# Patient Record
Sex: MALE | Race: BLACK OR AFRICAN AMERICAN | NOT HISPANIC OR LATINO | Employment: OTHER | URBAN - METROPOLITAN AREA
[De-identification: names, ages, dates, MRNs, and addresses within clinical notes are randomized per-mention and may not be internally consistent; named-entity substitution may affect disease eponyms.]

---

## 2017-01-11 ENCOUNTER — LAB VISIT (OUTPATIENT)
Dept: LAB | Facility: HOSPITAL | Age: 67
End: 2017-01-11
Attending: INTERNAL MEDICINE
Payer: MEDICARE

## 2017-01-11 DIAGNOSIS — N18.30 CHRONIC KIDNEY DISEASE, STAGE III (MODERATE): ICD-10-CM

## 2017-01-11 LAB
ALBUMIN SERPL BCP-MCNC: 3.6 G/DL
ANION GAP SERPL CALC-SCNC: 6 MMOL/L
BUN SERPL-MCNC: 16 MG/DL
CALCIUM SERPL-MCNC: 9.7 MG/DL
CHLORIDE SERPL-SCNC: 99 MMOL/L
CO2 SERPL-SCNC: 33 MMOL/L
CREAT SERPL-MCNC: 1.4 MG/DL
EST. GFR  (AFRICAN AMERICAN): 59.3 ML/MIN/1.73 M^2
EST. GFR  (NON AFRICAN AMERICAN): 51.3 ML/MIN/1.73 M^2
GLUCOSE SERPL-MCNC: 115 MG/DL
PHOSPHATE SERPL-MCNC: 2.2 MG/DL
POTASSIUM SERPL-SCNC: 4.6 MMOL/L
PTH-INTACT SERPL-MCNC: 69 PG/ML
SODIUM SERPL-SCNC: 138 MMOL/L

## 2017-01-11 PROCEDURE — 36415 COLL VENOUS BLD VENIPUNCTURE: CPT

## 2017-01-11 PROCEDURE — 83970 ASSAY OF PARATHORMONE: CPT

## 2017-01-11 PROCEDURE — 80069 RENAL FUNCTION PANEL: CPT

## 2017-01-31 ENCOUNTER — OFFICE VISIT (OUTPATIENT)
Dept: INTERNAL MEDICINE | Facility: CLINIC | Age: 67
End: 2017-01-31
Payer: MEDICARE

## 2017-01-31 VITALS
SYSTOLIC BLOOD PRESSURE: 138 MMHG | DIASTOLIC BLOOD PRESSURE: 64 MMHG | OXYGEN SATURATION: 94 % | BODY MASS INDEX: 46.65 KG/M2 | TEMPERATURE: 98 F | HEIGHT: 69 IN | HEART RATE: 75 BPM | WEIGHT: 315 LBS

## 2017-01-31 DIAGNOSIS — R73.03 PRE-DIABETES: ICD-10-CM

## 2017-01-31 DIAGNOSIS — N52.9 ERECTILE DYSFUNCTION, UNSPECIFIED ERECTILE DYSFUNCTION TYPE: ICD-10-CM

## 2017-01-31 DIAGNOSIS — E78.5 HYPERLIPIDEMIA, UNSPECIFIED HYPERLIPIDEMIA TYPE: ICD-10-CM

## 2017-01-31 DIAGNOSIS — N18.3 CHRONIC KIDNEY DISEASE (CKD), STAGE 3 (MODERATE): ICD-10-CM

## 2017-01-31 DIAGNOSIS — Z23 INFLUENZA VACCINE NEEDED: ICD-10-CM

## 2017-01-31 DIAGNOSIS — E66.01 MORBID OBESITY WITH BODY MASS INDEX (BMI) OF 40.0 TO 49.9: ICD-10-CM

## 2017-01-31 DIAGNOSIS — C61 CANCER OF PROSTATE WITH HIGH RECURRENCE RISK (STAGE T3A OR GLEASON 8-10 OR PSA > 20): ICD-10-CM

## 2017-01-31 DIAGNOSIS — I11.9 HYPERTENSIVE HEART DISEASE WITHOUT HEART FAILURE: Primary | ICD-10-CM

## 2017-01-31 PROCEDURE — 3075F SYST BP GE 130 - 139MM HG: CPT | Mod: S$GLB,,, | Performed by: INTERNAL MEDICINE

## 2017-01-31 PROCEDURE — 1157F ADVNC CARE PLAN IN RCRD: CPT | Mod: S$GLB,,, | Performed by: INTERNAL MEDICINE

## 2017-01-31 PROCEDURE — 1126F AMNT PAIN NOTED NONE PRSNT: CPT | Mod: S$GLB,,, | Performed by: INTERNAL MEDICINE

## 2017-01-31 PROCEDURE — 1160F RVW MEDS BY RX/DR IN RCRD: CPT | Mod: S$GLB,,, | Performed by: INTERNAL MEDICINE

## 2017-01-31 PROCEDURE — 90662 IIV NO PRSV INCREASED AG IM: CPT | Mod: S$GLB,,, | Performed by: INTERNAL MEDICINE

## 2017-01-31 PROCEDURE — 99999 PR PBB SHADOW E&M-EST. PATIENT-LVL III: CPT | Mod: PBBFAC,,, | Performed by: INTERNAL MEDICINE

## 2017-01-31 PROCEDURE — 99499 UNLISTED E&M SERVICE: CPT | Mod: S$GLB,,, | Performed by: INTERNAL MEDICINE

## 2017-01-31 PROCEDURE — 3078F DIAST BP <80 MM HG: CPT | Mod: S$GLB,,, | Performed by: INTERNAL MEDICINE

## 2017-01-31 PROCEDURE — 99214 OFFICE O/P EST MOD 30 MIN: CPT | Mod: 25,S$GLB,, | Performed by: INTERNAL MEDICINE

## 2017-01-31 PROCEDURE — G0008 ADMIN INFLUENZA VIRUS VAC: HCPCS | Mod: S$GLB,,, | Performed by: INTERNAL MEDICINE

## 2017-01-31 PROCEDURE — 1159F MED LIST DOCD IN RCRD: CPT | Mod: S$GLB,,, | Performed by: INTERNAL MEDICINE

## 2017-01-31 RX ORDER — CARVEDILOL 12.5 MG/1
12.5 TABLET ORAL 2 TIMES DAILY
Qty: 180 TABLET | Refills: 1 | Status: SHIPPED | OUTPATIENT
Start: 2017-01-31 | End: 2018-01-08 | Stop reason: SDUPTHER

## 2017-01-31 RX ORDER — TADALAFIL 20 MG/1
20 TABLET ORAL DAILY PRN
Qty: 3 TABLET | Refills: 5 | Status: SHIPPED | OUTPATIENT
Start: 2017-01-31 | End: 2018-10-26

## 2017-01-31 RX ORDER — HYDROCHLOROTHIAZIDE 25 MG/1
25 TABLET ORAL DAILY
Qty: 90 TABLET | Refills: 1 | Status: SHIPPED | OUTPATIENT
Start: 2017-01-31 | End: 2017-10-03 | Stop reason: SDUPTHER

## 2017-01-31 NOTE — MR AVS SNAPSHOT
[facility] - Internal Medicine  [address]  [place] [address]  Phone: [phone]  Fax: [phone]                  Gregg Mcbride   2017 2:00 PM   Office Visit    Description:  Male : 1950   Provider:  Cecilia Moreno MD   Department:  [facility] - Internal Medicine           Reason for Visit     Follow-up           Diagnoses this Visit        Comments    Hypertensive heart disease without heart failure    -  Primary     Chronic kidney disease (CKD), stage 3 (moderate)         Hyperlipidemia, unspecified hyperlipidemia type         Pre-diabetes         Erectile dysfunction, unspecified erectile dysfunction type         Morbid obesity with body mass index (BMI) of 40.0 to 49.9         Influenza vaccine needed                To Do List           Goals (5 Years of Data)     None      Follow-Up and Disposition     Return in about 6 months (around 2017).       These Medications        Disp Refills Start End    carvedilol (COREG) 12.5 MG tablet 180 tablet 1 2017     Take 1 tablet (12.5 mg total) by mouth 2 (two) times daily. FOR BLOOD PRESSURE - Oral    Pharmacy: [facility] - [address] Ph #: [phone]       hydrochlorothiazide (HYDRODIURIL) 25 MG tablet 90 tablet 1 2017     Take 1 tablet (25 mg total) by mouth once daily. - Oral    Pharmacy: [facility] - [facility] [address] [facility] Ph #: [phone]       tadalafil (CIALIS) 20 MG Tab 3 tablet 5 2017     Take 1 tablet (20 mg total) by mouth daily as needed. - Oral    Pharmacy: [facility] - [facility] [address] [facility] Ph #: [phone]         [facility] On [facility]     [facility] Care [facility] -  Assistance  Registered nurses in the [facility] provide clinical advisement, health education, appointment booking, and other advisory services.  Call for this free service at [phone].             Medications  "          Message regarding Medications     Verify the changes and/or additions to your medication regime listed below are the same as discussed with your clinician today.  If any of these changes or additions are incorrect, please notify your healthcare provider.             Verify that the below list of medications is an accurate representation of the medications you are currently taking.  If none reported, the list may be blank. If incorrect, please contact your healthcare provider. Carry this list with you in case of emergency.           Current Medications     atorvastatin (LIPITOR) 10 MG tablet TAKE ONE TABLET EVERY DAY FOR CHOLESTEROL    carvedilol (COREG) 12.5 MG tablet Take 1 tablet (12.5 mg total) by mouth 2 (two) times daily. FOR BLOOD PRESSURE    hydrochlorothiazide (HYDRODIURIL) 25 MG tablet Take 1 tablet (25 mg total) by mouth once daily.    losartan (COZAAR) 100 MG tablet TAKE 1 TABLET ONCE DAILY FOR BLOOD PRESSURE    spironolactone (ALDACTONE) 25 MG tablet Take 1 tablet (25 mg total) by mouth once daily.    tadalafil (CIALIS) 20 MG Tab Take 1 tablet (20 mg total) by mouth daily as needed.    tamsulosin (FLOMAX) 0.4 mg Cp24 TAKE ONE CAPSULE BY MOUTH DAILY.           Clinical Reference Information           Vital Signs - Last Recorded  Most recent update: 1/31/2017  2:16 PM by Madeleine Covarrubias LPN    BP Pulse Temp Ht Wt SpO2    138/64 75 97.6 °F (36.4 °C) 5' 9" (1.753 m) (!) 153.2 kg (337 lb 11.9 oz) (!) 94%    BMI                49.88 kg/m2          Blood Pressure          Most Recent Value    BP  138/64      Allergies as of 1/31/2017     Sulfa (Sulfonamide Antibiotics)      Immunizations Administered on Date of Encounter - 1/31/2017     None      Orders Placed During Today's Visit     Future Labs/Procedures Expected by Expires    CBC auto differential  1/31/2017 1/31/2018    Comprehensive metabolic panel  1/31/2017 1/31/2018    Hemoglobin A1c  1/31/2017 1/31/2018    Lipid panel  1/31/2017 1/31/2018    "   [facility] Sign-Up     Activating your [facility] account is as easy as 1-2-3!     1) Visit [email or web address], select Sign Up Now, enter this activation code and your date of birth, then select Next.  Activation code not generated  Current Patient Portal Status: Account disabled      2) Create a username and password to use when you visit [facility] in the future and select a security question in case you lose your password and select Next.    3) Enter your e-mail address and click Sign Up!    Additional Information  If you have questions, please e-mail [email or web address] or call [phone] to talk to our [facility] staff. Remember, [facility] is NOT to be used for urgent needs. For medical emergencies, dial 911.

## 2017-02-01 NOTE — PROGRESS NOTES
Subjective:       Patient ID: Gregg Mcbride is a 66 y.o. male.    Chief Complaint: Follow-up    HPI Comments: Last seen 7 months ago. Returns for f/u chronic medical conditions. No home BP monitoring. Taking meds as prescribed except often misses evening dose of Coreg. No new complaints.      PMH:   Hypertension. Ex Stress Echo 3/11 negative for ischemia, LVH with EF 65%.  Hyperlipidemia. , HDL 31, LDL 95 .  Morbid Obesity, TSH normal 10/13.   Prostate Cancer diagnosed , followed by Rad/Onc, PSA 0.28 .  CKD stage 2-3, Creat 1.4, GFR 59, Nephrology following.  Left Renal Cell Carcinoma resected.   H/O Lumbar spine injury, work related.   Normocytic Anemia, mild.   Elevated Glucose, HbA1c 5.8% .   Erectile Dysfunction.     Eye exam . Colonoscopy normal  (polyps in past). Flu shot . Pneumovax . Prevnar .     PSH: Lithotripsy. Ureteroscopic stone removal . Right IHR. Right foot surgery, pin in great toe due to crush injury. Partial Left Nephrectomy .    Social: Tobacco use - occasional cigarette. Alcohol - 1-2 beers, [place] on weekends. , wife is a liver transplant recipient. Adult daughter and son both live locally. Retired  at [employer]. Works part-time cutting grass.     FMH: Father  age 53 of [place], nonsmoker. Mother living age 90, health unknown (does take a lot of medications). Patient is the second of 12 children, some are diabetic.     Allergies: Sulfa - rash.    Medications: list reviewed and reconciled.          Review of Systems   Constitutional: Negative for appetite change, chills, fever and unexpected weight change.   HENT: Negative for congestion, sore throat and trouble swallowing.    Eyes: Negative for visual disturbance.   Respiratory: Negative for cough, chest tightness and shortness of breath.    Cardiovascular: Negative for chest pain, palpitations and leg swelling.   Gastrointestinal: Negative for abdominal pain,  "diarrhea, nausea and vomiting.   Genitourinary: Negative for dysuria and frequency.   Musculoskeletal: Negative for arthralgias and myalgias.   Skin: Negative for rash and wound.   Neurological: Negative for dizziness, syncope, weakness, numbness and headaches.   Psychiatric/Behavioral: Negative for dysphoric mood. The patient is not nervous/anxious.        Objective:    /64, Pulse 75, Ht 5' 9", Wt 338 lbs (stable), BMI=49.9  Physical Exam   Constitutional: He is oriented to person, place, and time. No distress.   Morbidly obese man, well groomed, ambulatory with a normal gait.    HENT:   Nose: Nose normal.   Mouth/Throat: Oropharynx is clear and moist.   Neck: Normal range of motion. Neck supple. No JVD present.   Cardiovascular: Normal rate, regular rhythm and normal heart sounds.    Pulmonary/Chest: Effort normal and breath sounds normal. No respiratory distress.   Musculoskeletal: Normal range of motion.   Mild edema at ankles. No stasis dermatitis.   Neurological: He is alert and oriented to person, place, and time. No cranial nerve deficit. Coordination normal.   Skin: Skin is warm and dry. He is not diaphoretic.   Psychiatric: He has a normal mood and affect. His behavior is normal. Thought content normal.       Assessment:       1. Hypertensive heart disease without heart failure    2. Chronic kidney disease (CKD), stage 3 (moderate)    3. Hyperlipidemia, unspecified hyperlipidemia type    4. Pre-diabetes    5. Erectile dysfunction, unspecified erectile dysfunction type    6. Morbid obesity with body mass index (BMI) of 40.0 to 49.9    7. Cancer of prostate with high recurrence risk (stage T3a or [place] 8-10 or PSA > 20)    8. Influenza vaccine needed        Plan:       Hypertensive heart disease without heart failure  -     CBC auto differential; Future; Expected date: 1/31/17  -     Comprehensive metabolic panel; Future; Expected date: 1/31/17  -     carvedilol (COREG) 12.5 MG tablet; Take 1 tablet " (12.5 mg total) by mouth 2 (two) times daily. FOR BLOOD PRESSURE  Dispense: 180 tablet; Refill: 1  -     hydrochlorothiazide (HYDRODIURIL) 25 MG tablet; Take 1 tablet (25 mg total) by mouth once daily.  Dispense: 90 tablet; Refill: 1    Chronic kidney disease (CKD), stage 3 (moderate)    Hyperlipidemia, unspecified hyperlipidemia type  -     Lipid panel; Future; Expected date: 1/31/17    Pre-diabetes  -     Hemoglobin A1c; Future; Expected date: 1/31/17    Erectile dysfunction, unspecified erectile dysfunction type  -     tadalafil (CIALIS) 20 MG Tab; Take 1 tablet (20 mg total) by mouth daily as needed.  Dispense: 3 tablet; Refill: 5    Morbid obesity with body mass index (BMI) of 40.0 to 49.9        -     Counseled on diet for weight reduction.     Cancer of prostate with high recurrence risk (stage T3a or [place] 8-10 or PSA > 20)        -     Surveillance is up to date.    Influenza vaccine needed  -     Influenza - High Dose (65+) (PF) (IM)

## 2017-04-08 DIAGNOSIS — R60.9 EDEMA: ICD-10-CM

## 2017-04-09 RX ORDER — SPIRONOLACTONE 25 MG/1
TABLET ORAL
Qty: 90 TABLET | Refills: 1 | Status: SHIPPED | OUTPATIENT
Start: 2017-04-09 | End: 2017-10-03 | Stop reason: SDUPTHER

## 2017-05-23 DIAGNOSIS — E78.5 HYPERLIPIDEMIA LDL GOAL <130: ICD-10-CM

## 2017-05-23 RX ORDER — ATORVASTATIN CALCIUM 10 MG/1
TABLET, FILM COATED ORAL
Qty: 90 TABLET | Refills: 1 | Status: SHIPPED | OUTPATIENT
Start: 2017-05-23 | End: 2017-11-20 | Stop reason: SDUPTHER

## 2017-08-04 DIAGNOSIS — C61 CANCER OF PROSTATE: Primary | ICD-10-CM

## 2017-08-09 ENCOUNTER — LAB VISIT (OUTPATIENT)
Dept: LAB | Facility: HOSPITAL | Age: 67
End: 2017-08-09
Attending: RADIOLOGY
Payer: MEDICARE

## 2017-08-09 DIAGNOSIS — C61 CANCER OF PROSTATE: ICD-10-CM

## 2017-08-09 LAB — COMPLEXED PSA SERPL-MCNC: 0.61 NG/ML

## 2017-08-09 PROCEDURE — 36415 COLL VENOUS BLD VENIPUNCTURE: CPT

## 2017-08-09 PROCEDURE — 84153 ASSAY OF PSA TOTAL: CPT

## 2017-08-14 ENCOUNTER — OFFICE VISIT (OUTPATIENT)
Dept: RADIATION ONCOLOGY | Facility: CLINIC | Age: 67
End: 2017-08-14
Payer: MEDICARE

## 2017-08-14 VITALS
DIASTOLIC BLOOD PRESSURE: 90 MMHG | SYSTOLIC BLOOD PRESSURE: 158 MMHG | WEIGHT: 315 LBS | BODY MASS INDEX: 46.65 KG/M2 | HEART RATE: 70 BPM | HEIGHT: 69 IN | RESPIRATION RATE: 18 BRPM

## 2017-08-14 DIAGNOSIS — C61 CANCER OF PROSTATE WITH HIGH RECURRENCE RISK (STAGE T3A OR GLEASON 8-10 OR PSA > 20): Primary | ICD-10-CM

## 2017-08-14 PROCEDURE — 1159F MED LIST DOCD IN RCRD: CPT | Mod: S$GLB,,, | Performed by: RADIOLOGY

## 2017-08-14 PROCEDURE — 99212 OFFICE O/P EST SF 10 MIN: CPT | Mod: S$GLB,,, | Performed by: RADIOLOGY

## 2017-08-14 PROCEDURE — 99999 PR PBB SHADOW E&M-EST. PATIENT-LVL III: CPT | Mod: PBBFAC,,, | Performed by: RADIOLOGY

## 2017-08-14 PROCEDURE — 3080F DIAST BP >= 90 MM HG: CPT | Mod: S$GLB,,, | Performed by: RADIOLOGY

## 2017-08-14 PROCEDURE — 1126F AMNT PAIN NOTED NONE PRSNT: CPT | Mod: S$GLB,,, | Performed by: RADIOLOGY

## 2017-08-14 PROCEDURE — 3077F SYST BP >= 140 MM HG: CPT | Mod: S$GLB,,, | Performed by: RADIOLOGY

## 2017-08-14 PROCEDURE — 3008F BODY MASS INDEX DOCD: CPT | Mod: S$GLB,,, | Performed by: RADIOLOGY

## 2017-08-14 RX ORDER — TAMSULOSIN HYDROCHLORIDE 0.4 MG/1
1 CAPSULE ORAL DAILY
Qty: 90 CAPSULE | Refills: 3 | Status: SHIPPED | OUTPATIENT
Start: 2017-08-14 | End: 2018-09-05 | Stop reason: SDUPTHER

## 2017-08-14 NOTE — PROGRESS NOTES
Subjective:       Patient ID: Gregg Mcbride is a 66 y.o. male.    Chief Complaint: Prostate Cancer (1yr f/u;psa)    This patient returns for follow up visit.     Mr. Mcbride has a history of Stage II (T2c, N0, M0) adenocarcinoma of the prostate. PSA was performed in September of 2009 returned at 31 ng/ml. TRUS and biopsy of the prostate revealed adenocarcinoma in 100% of the biopsies. The majority had a [place] score of 8 (4+4) although the biopsies [place] revealed [place] score 9 (4+5) disease. Further work up with whole body bone scan on 2/4/10 revealed no evidence of metastatic disease. The patient received an Eligard injection on 2/4/10. After 8 weeks of therapy, the patient was referred for definitive radiotherapy. He completed radiotherapy to the prostate and pelvic nodes on 7/1/10. His last Eligard injection was given in August of 2012.  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 The patient has remained HORTENCIA since that time. Recently his PSA began to increase.  We gave a trial of Cipro but is has continued to rise.  No urinary complaints.        Review of Systems   Constitutional: Negative for activity change, appetite change, chills and fatigue.   Gastrointestinal: Negative for abdominal pain, constipation and diarrhea.   Genitourinary: Negative for difficulty urinating, dysuria, hematuria and testicular pain.       Objective:      Physical Exam   Constitutional: He appears well-developed and well-nourished. No distress.   Abdominal: Soft. He exhibits no distension.   Musculoskeletal: Normal range of motion.       PSA - increased to 0.61 ng/ml.  Assessment:       Prostate cancer.     Plan:       His PSA continues to rise.  Increased from 0.28 in June of 2016. Discussed the PSA results.  Likely the patient is producing testosterone.  Will plan follow up in 6 months with testosterone.

## 2017-10-03 DIAGNOSIS — I11.9 HYPERTENSIVE HEART DISEASE WITHOUT HEART FAILURE: ICD-10-CM

## 2017-10-03 DIAGNOSIS — R60.9 EDEMA: ICD-10-CM

## 2017-10-04 RX ORDER — LOSARTAN POTASSIUM 100 MG/1
TABLET ORAL
Qty: 90 TABLET | Refills: 0 | Status: SHIPPED | OUTPATIENT
Start: 2017-10-04 | End: 2018-01-05 | Stop reason: SDUPTHER

## 2017-10-04 RX ORDER — HYDROCHLOROTHIAZIDE 25 MG/1
TABLET ORAL
Qty: 90 TABLET | Refills: 0 | Status: SHIPPED | OUTPATIENT
Start: 2017-10-04 | End: 2018-01-05 | Stop reason: SDUPTHER

## 2017-10-04 RX ORDER — SPIRONOLACTONE 25 MG/1
TABLET ORAL
Qty: 90 TABLET | Refills: 0 | Status: SHIPPED | OUTPATIENT
Start: 2017-10-04 | End: 2018-01-05 | Stop reason: SDUPTHER

## 2017-10-05 ENCOUNTER — LAB VISIT (OUTPATIENT)
Dept: LAB | Facility: HOSPITAL | Age: 67
End: 2017-10-05
Attending: INTERNAL MEDICINE
Payer: MEDICARE

## 2017-10-05 ENCOUNTER — OFFICE VISIT (OUTPATIENT)
Dept: INTERNAL MEDICINE | Facility: CLINIC | Age: 67
End: 2017-10-05
Payer: MEDICARE

## 2017-10-05 VITALS
DIASTOLIC BLOOD PRESSURE: 80 MMHG | HEIGHT: 69 IN | WEIGHT: 315 LBS | HEART RATE: 57 BPM | BODY MASS INDEX: 46.65 KG/M2 | SYSTOLIC BLOOD PRESSURE: 120 MMHG

## 2017-10-05 DIAGNOSIS — Z23 INFLUENZA VACCINE NEEDED: ICD-10-CM

## 2017-10-05 DIAGNOSIS — E66.01 MORBID OBESITY WITH BODY MASS INDEX (BMI) OF 40.0 TO 49.9: ICD-10-CM

## 2017-10-05 DIAGNOSIS — N18.30 CHRONIC KIDNEY DISEASE, STAGE III (MODERATE): ICD-10-CM

## 2017-10-05 DIAGNOSIS — R73.03 PRE-DIABETES: ICD-10-CM

## 2017-10-05 DIAGNOSIS — E78.5 HYPERLIPIDEMIA, UNSPECIFIED HYPERLIPIDEMIA TYPE: ICD-10-CM

## 2017-10-05 DIAGNOSIS — I11.9 HYPERTENSIVE HEART DISEASE WITHOUT HEART FAILURE: Primary | ICD-10-CM

## 2017-10-05 DIAGNOSIS — I11.9 HYPERTENSIVE HEART DISEASE WITHOUT HEART FAILURE: ICD-10-CM

## 2017-10-05 LAB
ANION GAP SERPL CALC-SCNC: 10 MMOL/L
BUN SERPL-MCNC: 23 MG/DL
CALCIUM SERPL-MCNC: 9.3 MG/DL
CHLORIDE SERPL-SCNC: 100 MMOL/L
CO2 SERPL-SCNC: 28 MMOL/L
CREAT SERPL-MCNC: 1.6 MG/DL
EST. GFR  (AFRICAN AMERICAN): 50 ML/MIN/1.73 M^2
EST. GFR  (NON AFRICAN AMERICAN): 43 ML/MIN/1.73 M^2
GLUCOSE SERPL-MCNC: 115 MG/DL
POTASSIUM SERPL-SCNC: 4.2 MMOL/L
SODIUM SERPL-SCNC: 138 MMOL/L

## 2017-10-05 PROCEDURE — 90662 IIV NO PRSV INCREASED AG IM: CPT | Mod: S$GLB,,, | Performed by: INTERNAL MEDICINE

## 2017-10-05 PROCEDURE — 36415 COLL VENOUS BLD VENIPUNCTURE: CPT

## 2017-10-05 PROCEDURE — 99999 PR PBB SHADOW E&M-EST. PATIENT-LVL III: CPT | Mod: PBBFAC,,, | Performed by: INTERNAL MEDICINE

## 2017-10-05 PROCEDURE — 99499 UNLISTED E&M SERVICE: CPT | Mod: S$GLB,,, | Performed by: INTERNAL MEDICINE

## 2017-10-05 PROCEDURE — 80048 BASIC METABOLIC PNL TOTAL CA: CPT

## 2017-10-05 PROCEDURE — G0008 ADMIN INFLUENZA VIRUS VAC: HCPCS | Mod: S$GLB,,, | Performed by: INTERNAL MEDICINE

## 2017-10-05 PROCEDURE — 99214 OFFICE O/P EST MOD 30 MIN: CPT | Mod: S$GLB,,, | Performed by: INTERNAL MEDICINE

## 2017-10-08 NOTE — PROGRESS NOTES
Subjective:       Patient ID: Gregg Mcbride is a 67 y.o. male.    Chief Complaint: Hypertension    Last seen 8 months ago. Returns for f/u chronic medical conditions. Taking meds as prescribed. No home BP monitoring reported. Some stress due to recent deaths in the family, otherwise feeling well.     PMH:   Hypertension. Ex Stress Echo 3/11 negative for ischemia, LVH with EF 65%.  Hyperlipidemia. TChol 129, , HDL 30, LDL 78 .  Morbid Obesity, TSH normal 10/13.   Prostate Cancer diagnosed , followed by Rad/Onc, PSA 0.61 Aug. '17.  CKD stage 2-3, Creat 1.4, GFR 59, Nephrology following.  Left Renal Cell Carcinoma resected.   H/O Lumbar spine injury, work related.   Normocytic Anemia, mild.   Elevated Glucose, HbA1c 5.8% .  Erectile Dysfunction.     Eye exam . Colonoscopy normal  (polyps in past). Flu shot . Pneumovax . Prevnar .     PSH: Lithotripsy. Ureteroscopic stone removal . Right IHR. Right foot surgery, pin in great toe due to crush injury. Partial Left Nephrectomy .    Social: Tobacco use - occasional cigarette. Alcohol - 1-2 beers, [place] on weekends. , wife is a liver transplant recipient. Adult daughter and son both live locally. 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     FMH: Father  age 53 of [place], nonsmoker. Mother living age 90, health unknown (does take a lot of medications). Patient is the second of 12 children, some are diabetic.     Allergies: Sulfa - rash.    Medications: list reviewed and reconciled.            Review of Systems   Constitutional: Negative for activity change, appetite change, diaphoresis, fatigue and fever.   Eyes: Negative for visual disturbance.   Respiratory: Negative for cough and shortness of breath.    Cardiovascular: Negative for chest pain, palpitations and leg swelling.   Gastrointestinal: Negative for abdominal pain, nausea and vomiting.   Genitourinary: Negative for dysuria,  frequency and hematuria.   Musculoskeletal: Negative for arthralgias and myalgias.   Skin: Negative for rash and wound.   Neurological: Negative for dizziness, syncope and headaches.   Psychiatric/Behavioral: Negative for dysphoric mood. The patient is not nervous/anxious.        Objective:    /80, Pulse 57, Wt 326 lbs (from 338), BMI=48  Physical Exam   Constitutional: He is oriented to person, place, and time. No distress.   HENT:   Nose: Nose normal.   Mouth/Throat: Oropharynx is clear and moist.   Eyes: Conjunctivae and EOM are normal.   Neck: Normal range of motion. Neck supple. No JVD present.   Cardiovascular: Normal rate, regular rhythm and normal heart sounds.    Pulmonary/Chest: Effort normal and breath sounds normal. No respiratory distress. He has no wheezes. He has no rales.   Musculoskeletal: Normal range of motion. He exhibits no edema.   Neurological: He is alert and oriented to person, place, and time.   Skin: Skin is warm and dry. No rash noted. He is not diaphoretic.   Psychiatric: He has a normal mood and affect. His behavior is normal.       Assessment:       1. Hypertensive heart disease without heart failure    2. Hyperlipidemia, unspecified hyperlipidemia type    3. Pre-diabetes    4. Chronic kidney disease, stage III (moderate)    5. Morbid obesity with body mass index (BMI) of 40.0 to 49.9    6. Influenza vaccine needed        Plan:       Hypertensive heart disease without heart failure - controlled, continue same.   -     Basic metabolic panel; Future; Expected date: 10/05/2017    Hyperlipidemia, unspecified hyperlipidemia type - controlled, continue same.     Pre-diabetes - stable on diet, chemistry as above.     Chronic kidney disease, stage III (moderate) - stable.     Morbid obesity with body mass index (BMI) of 40.0 to 49.9 - discussed diet for weight reduction, glucose control, heart health.     Influenza vaccine needed  -     Influenza - High Dose (65+) (PF) (IM)

## 2017-11-20 DIAGNOSIS — E78.5 HYPERLIPIDEMIA LDL GOAL <130: ICD-10-CM

## 2017-11-20 RX ORDER — ATORVASTATIN CALCIUM 10 MG/1
TABLET, FILM COATED ORAL
Qty: 90 TABLET | Refills: 1 | Status: SHIPPED | OUTPATIENT
Start: 2017-11-20 | End: 2018-06-01 | Stop reason: SDUPTHER

## 2018-01-05 DIAGNOSIS — R60.9 EDEMA: ICD-10-CM

## 2018-01-05 DIAGNOSIS — I11.9 HYPERTENSIVE HEART DISEASE WITHOUT HEART FAILURE: ICD-10-CM

## 2018-01-05 RX ORDER — HYDROCHLOROTHIAZIDE 25 MG/1
TABLET ORAL
Qty: 90 TABLET | Refills: 1 | Status: SHIPPED | OUTPATIENT
Start: 2018-01-05 | End: 2018-06-01 | Stop reason: SDUPTHER

## 2018-01-05 RX ORDER — LOSARTAN POTASSIUM 100 MG/1
TABLET ORAL
Qty: 90 TABLET | Refills: 1 | Status: SHIPPED | OUTPATIENT
Start: 2018-01-05 | End: 2018-06-01 | Stop reason: SDUPTHER

## 2018-01-05 RX ORDER — SPIRONOLACTONE 25 MG/1
TABLET ORAL
Qty: 90 TABLET | Refills: 1 | Status: SHIPPED | OUTPATIENT
Start: 2018-01-05 | End: 2018-06-01 | Stop reason: SDUPTHER

## 2018-01-08 DIAGNOSIS — I11.9 HYPERTENSIVE HEART DISEASE WITHOUT HEART FAILURE: ICD-10-CM

## 2018-01-08 RX ORDER — CARVEDILOL 12.5 MG/1
TABLET ORAL
Qty: 180 TABLET | Refills: 1 | Status: SHIPPED | OUTPATIENT
Start: 2018-01-08 | End: 2018-06-01 | Stop reason: SDUPTHER

## 2018-02-02 ENCOUNTER — OFFICE VISIT (OUTPATIENT)
Dept: INTERNAL MEDICINE | Facility: CLINIC | Age: 68
End: 2018-02-02
Payer: MEDICARE

## 2018-02-02 ENCOUNTER — LAB VISIT (OUTPATIENT)
Dept: LAB | Facility: HOSPITAL | Age: 68
End: 2018-02-02
Attending: RADIOLOGY
Payer: MEDICARE

## 2018-02-02 VITALS
SYSTOLIC BLOOD PRESSURE: 112 MMHG | WEIGHT: 315 LBS | HEART RATE: 70 BPM | DIASTOLIC BLOOD PRESSURE: 60 MMHG | TEMPERATURE: 98 F | BODY MASS INDEX: 46.65 KG/M2 | HEIGHT: 69 IN

## 2018-02-02 DIAGNOSIS — I11.9 HYPERTENSIVE HEART DISEASE WITHOUT HEART FAILURE: Primary | ICD-10-CM

## 2018-02-02 DIAGNOSIS — C61 PROSTATE CANCER: ICD-10-CM

## 2018-02-02 DIAGNOSIS — E78.5 HYPERLIPIDEMIA, UNSPECIFIED HYPERLIPIDEMIA TYPE: ICD-10-CM

## 2018-02-02 DIAGNOSIS — J00 COMMON COLD VIRUS: ICD-10-CM

## 2018-02-02 DIAGNOSIS — N18.30 CKD (CHRONIC KIDNEY DISEASE) STAGE 3, GFR 30-59 ML/MIN: ICD-10-CM

## 2018-02-02 DIAGNOSIS — E66.01 MORBID OBESITY WITH BMI OF 45.0-49.9, ADULT: ICD-10-CM

## 2018-02-02 DIAGNOSIS — I11.9 HYPERTENSIVE HEART DISEASE WITHOUT HEART FAILURE: ICD-10-CM

## 2018-02-02 DIAGNOSIS — R73.03 PRE-DIABETES: ICD-10-CM

## 2018-02-02 DIAGNOSIS — C61 CANCER OF PROSTATE: ICD-10-CM

## 2018-02-02 LAB
ALBUMIN SERPL BCP-MCNC: 3.9 G/DL
ALP SERPL-CCNC: 83 U/L
ALT SERPL W/O P-5'-P-CCNC: 35 U/L
ANION GAP SERPL CALC-SCNC: 8 MMOL/L
AST SERPL-CCNC: 27 U/L
BASOPHILS # BLD AUTO: 0.02 K/UL
BASOPHILS NFR BLD: 0.5 %
BILIRUB SERPL-MCNC: 0.6 MG/DL
BUN SERPL-MCNC: 16 MG/DL
CALCIUM SERPL-MCNC: 9.6 MG/DL
CHLORIDE SERPL-SCNC: 99 MMOL/L
CHOLEST SERPL-MCNC: 123 MG/DL
CHOLEST/HDLC SERPL: 4.1 {RATIO}
CO2 SERPL-SCNC: 30 MMOL/L
COMPLEXED PSA SERPL-MCNC: 0.85 NG/ML
CREAT SERPL-MCNC: 1.5 MG/DL
DIFFERENTIAL METHOD: ABNORMAL
EOSINOPHIL # BLD AUTO: 0.1 K/UL
EOSINOPHIL NFR BLD: 2.6 %
ERYTHROCYTE [DISTWIDTH] IN BLOOD BY AUTOMATED COUNT: 12.2 %
EST. GFR  (AFRICAN AMERICAN): 54 ML/MIN/1.73 M^2
EST. GFR  (NON AFRICAN AMERICAN): 47 ML/MIN/1.73 M^2
ESTIMATED AVG GLUCOSE: 108 MG/DL
GLUCOSE SERPL-MCNC: 131 MG/DL
HBA1C MFR BLD HPLC: 5.4 %
HCT VFR BLD AUTO: 39.6 %
HDLC SERPL-MCNC: 30 MG/DL
HDLC SERPL: 24.4 %
HGB BLD-MCNC: 13.3 G/DL
LDLC SERPL CALC-MCNC: 76.4 MG/DL
LYMPHOCYTES # BLD AUTO: 1.1 K/UL
LYMPHOCYTES NFR BLD: 29.1 %
MCH RBC QN AUTO: 30.3 PG
MCHC RBC AUTO-ENTMCNC: 33.6 G/DL
MCV RBC AUTO: 90 FL
MONOCYTES # BLD AUTO: 0.5 K/UL
MONOCYTES NFR BLD: 11.9 %
NEUTROPHILS # BLD AUTO: 2.2 K/UL
NEUTROPHILS NFR BLD: 55.9 %
NONHDLC SERPL-MCNC: 93 MG/DL
PLATELET # BLD AUTO: 277 K/UL
PMV BLD AUTO: 10.7 FL
POTASSIUM SERPL-SCNC: 4.3 MMOL/L
PROT SERPL-MCNC: 7.8 G/DL
RBC # BLD AUTO: 4.39 M/UL
SODIUM SERPL-SCNC: 137 MMOL/L
TESTOST SERPL-MCNC: 282 NG/DL
TRIGL SERPL-MCNC: 83 MG/DL
WBC # BLD AUTO: 3.85 K/UL

## 2018-02-02 PROCEDURE — 83036 HEMOGLOBIN GLYCOSYLATED A1C: CPT

## 2018-02-02 PROCEDURE — 84153 ASSAY OF PSA TOTAL: CPT

## 2018-02-02 PROCEDURE — 3008F BODY MASS INDEX DOCD: CPT | Mod: S$GLB,,, | Performed by: INTERNAL MEDICINE

## 2018-02-02 PROCEDURE — 1126F AMNT PAIN NOTED NONE PRSNT: CPT | Mod: S$GLB,,, | Performed by: INTERNAL MEDICINE

## 2018-02-02 PROCEDURE — 85025 COMPLETE CBC W/AUTO DIFF WBC: CPT

## 2018-02-02 PROCEDURE — 84403 ASSAY OF TOTAL TESTOSTERONE: CPT

## 2018-02-02 PROCEDURE — 99999 PR PBB SHADOW E&M-EST. PATIENT-LVL III: CPT | Mod: PBBFAC,,, | Performed by: INTERNAL MEDICINE

## 2018-02-02 PROCEDURE — 99499 UNLISTED E&M SERVICE: CPT | Mod: S$GLB,,, | Performed by: INTERNAL MEDICINE

## 2018-02-02 PROCEDURE — 36415 COLL VENOUS BLD VENIPUNCTURE: CPT

## 2018-02-02 PROCEDURE — 80061 LIPID PANEL: CPT

## 2018-02-02 PROCEDURE — 99214 OFFICE O/P EST MOD 30 MIN: CPT | Mod: S$GLB,,, | Performed by: INTERNAL MEDICINE

## 2018-02-02 PROCEDURE — 1159F MED LIST DOCD IN RCRD: CPT | Mod: S$GLB,,, | Performed by: INTERNAL MEDICINE

## 2018-02-02 PROCEDURE — 80053 COMPREHEN METABOLIC PANEL: CPT

## 2018-02-04 NOTE — PROGRESS NOTES
Subjective:       Patient ID: Gregg Mcbride is a 67 y.o. male.    Chief Complaint: Hypertension    Last seen 4 months ago. Returns for f/u chronic medical conditions. Taking meds as prescribed. No home BP monitoring reported. Some stress due to recent deaths in the family. Currently with upper respiratory symptoms x 1 week including sore throat, nasal congestion and runny nose, cough productive of clear mucus, headache. No fever, chills, body aches, sinus pain/pressure, purulent nasal discharge, earache, severe throat pain, pleuritic chest pain, wheezing, SOB, N/V or diarrhea. Using Robitussin with little relief.     PMH:   Hypertension. Ex Stress Echo 3/11 negative for ischemia, LVH with EF 65%.  Hyperlipidemia. TChol 129, , HDL 30, LDL 78 .  Morbid Obesity, TSH normal 10/13.   Prostate Cancer diagnosed , followed by Rad/Onc, PSA 0.61 Aug. '17.  CKD stage 2-3, Creat 1.4, GFR 59, Nephrology following.  Left Renal Cell Carcinoma resected.   H/O Lumbar spine injury, work related.   Normocytic Anemia, mild.   Elevated Glucose, HbA1c 5.8% .  Erectile Dysfunction.     Eye exam . Colonoscopy normal  (polyps in past). Flu shot 10/17. Pneumovax . Prevnar .     PSH: Lithotripsy. Ureteroscopic stone removal . Right IHR. Right foot surgery, pin in great toe due to crush injury. Partial Left Nephrectomy .    Social: Tobacco use - occasional cigarette. Alcohol - 1-2 beers, [place] on weekends. , wife is a liver transplant recipient. Adult daughter and son both live locally. 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     FMH: Father  age 53 of [place], nonsmoker. Mother living age 90, health unknown (does take a lot of medications). Patient is the second of 12 children, some are diabetic.     Allergies: Sulfa - rash.    Medications: list reviewed and reconciled.            Review of Systems   Constitutional: Negative for appetite change and unexpected  "weight change.   Eyes: Negative for pain and visual disturbance.   Cardiovascular: Negative for chest pain, palpitations and leg swelling.   Gastrointestinal: Negative for abdominal pain, diarrhea, nausea and vomiting.   Genitourinary: Negative for dysuria and frequency.   Musculoskeletal: Negative for arthralgias and myalgias.   Skin: Negative for rash and wound.   Neurological: Negative for dizziness, syncope, weakness and headaches.   Psychiatric/Behavioral: Negative for agitation and dysphoric mood. The patient is not nervous/anxious.        Objective:    /60, Pulse 70, Temp 98.0, Ht 5' 9", Wt 324 lbs, BMI=47.9  Physical Exam   Constitutional: He is oriented to person, place, and time. He appears well-developed and well-nourished. No distress.   Ambulatory, not ill-appearing.   HENT:   Nose: Nose normal.   Mouth/Throat: Oropharynx is clear and moist. No oropharyngeal exudate.   Eyes: Conjunctivae are normal. Right eye exhibits no discharge. Left eye exhibits no discharge.   Neck: Normal range of motion. Neck supple. No JVD present.   Cardiovascular: Normal rate, regular rhythm and normal heart sounds.    Pulmonary/Chest: Effort normal and breath sounds normal. No accessory muscle usage. No tachypnea. No respiratory distress. He has no decreased breath sounds. He has no wheezes. He has no rhonchi. He has no rales.   Musculoskeletal: Normal range of motion.   Trace edema both lower legs, no stasis dermatitis or ulceration.   Lymphadenopathy:     He has no cervical adenopathy.   Neurological: He is alert and oriented to person, place, and time. No cranial nerve deficit. Coordination normal.   Skin: Skin is warm and dry. He is not diaphoretic.   Psychiatric: He has a normal mood and affect. His behavior is normal.       Assessment:       1. Hypertensive heart disease without heart failure    2. Hyperlipidemia, unspecified hyperlipidemia type    3. CKD (chronic kidney disease) stage 3, GFR 30-59 ml/min    4. " Pre-diabetes    5. Common cold virus    6. Morbid obesity with BMI of 45.0-49.9, adult    7. Prostate cancer        Plan:       Hypertensive heart disease without heart failure  -     CBC auto differential; Future; Expected date: 02/02/2018  -     Comprehensive metabolic panel; Future; Expected date: 02/02/2018    Hyperlipidemia, unspecified hyperlipidemia type  -     Lipid panel; Future; Expected date: 02/02/2018    CKD (chronic kidney disease) stage 3, GFR 30-59 ml/min        -     Labs as above.    Pre-diabetes  -     Hemoglobin A1c; Future; Expected date: 02/02/2018    Common cold virus        -     No evidence of bacterial infection, doubt influenza, continue OTC meds prn symptoms, Mucinex DM recommended.     Morbid obesity with BMI of 45.0-49.9, adult    Prostate cancer - followed regularly by Urology, diagnostic PSA scheduled today.

## 2018-02-23 ENCOUNTER — OFFICE VISIT (OUTPATIENT)
Dept: RADIATION ONCOLOGY | Facility: CLINIC | Age: 68
End: 2018-02-23
Payer: MEDICARE

## 2018-02-23 VITALS
SYSTOLIC BLOOD PRESSURE: 169 MMHG | RESPIRATION RATE: 18 BRPM | WEIGHT: 315 LBS | DIASTOLIC BLOOD PRESSURE: 78 MMHG | BODY MASS INDEX: 46.65 KG/M2 | HEART RATE: 71 BPM | HEIGHT: 69 IN

## 2018-02-23 DIAGNOSIS — C61 CANCER OF PROSTATE: Primary | ICD-10-CM

## 2018-02-23 PROCEDURE — 3008F BODY MASS INDEX DOCD: CPT | Mod: S$GLB,,, | Performed by: RADIOLOGY

## 2018-02-23 PROCEDURE — 1126F AMNT PAIN NOTED NONE PRSNT: CPT | Mod: S$GLB,,, | Performed by: RADIOLOGY

## 2018-02-23 PROCEDURE — 1159F MED LIST DOCD IN RCRD: CPT | Mod: S$GLB,,, | Performed by: RADIOLOGY

## 2018-02-23 PROCEDURE — 99999 PR PBB SHADOW E&M-EST. PATIENT-LVL III: CPT | Mod: PBBFAC,,, | Performed by: RADIOLOGY

## 2018-02-23 PROCEDURE — 99212 OFFICE O/P EST SF 10 MIN: CPT | Mod: S$GLB,,, | Performed by: RADIOLOGY

## 2018-02-23 NOTE — PROGRESS NOTES
Subjective:       Patient ID: Gregg Mcbride is a 67 y.o. male.    Chief Complaint: Prostate Cancer (6mo f/u;psa)    This patient returns for follow up visit.     Mr. Mcbride has a history of Stage II (T2c, N0, M0) adenocarcinoma of the prostate. PSA was performed in September of 2009 returned at 31 ng/ml. TRUS and biopsy of the prostate revealed adenocarcinoma in 100% of the biopsies. The majority had a [place] score of 8 (4+4) although the biopsies [place] revealed [place] score 9 (4+5) disease. Further work up with whole body bone scan on 2/4/10 revealed no evidence of metastatic disease. The patient received an Eligard injection on 2/4/10. After 8 weeks of therapy, the patient was referred for definitive radiotherapy. He completed radiotherapy to the prostate and pelvic nodes on 7/1/10. His last Eligard injection was given in August of 2012.  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 The patient has remained HORTENCIA since that time. Recently his PSA began to increase.  Today, the patient states he feels well.  No complaints of dysuria or hematuria.  No diarrhea. Nocturia at 2 - 3 times per night.       Review of Systems   Constitutional: Negative for activity change, appetite change, chills and fatigue.   Respiratory: Negative for cough and shortness of breath.    Gastrointestinal: Negative for abdominal pain, constipation and diarrhea.   Genitourinary: Negative for difficulty urinating, dysuria, frequency and hematuria.       Objective:      Physical Exam   Constitutional: He appears well-developed and well-nourished.   Abdominal: Soft. He exhibits no distension. There is no tenderness.   Genitourinary:   Genitourinary Comments: rectal deferred secondary to body habitus.        PSA - increased to 0.85 ng/ml  testosterone - 282   Assessment:       1. Cancer of prostate        Plan:       His PSA continues to increase with a short PSA doubling time.   PSA in June of 2017 was 0.28 ng/ml.  Discussed the results of his PSA.  Will plan to check bone scan and CT of the abdomen and pelvis to rule out evidence of metastatic disease.  If negative consider repeat biopsy of his prostate.

## 2018-02-28 ENCOUNTER — HOSPITAL ENCOUNTER (OUTPATIENT)
Dept: RADIOLOGY | Facility: HOSPITAL | Age: 68
Discharge: HOME | End: 2018-02-28
Attending: RADIOLOGY
Payer: MEDICARE

## 2018-02-28 DIAGNOSIS — C61 CANCER OF PROSTATE: ICD-10-CM

## 2018-02-28 PROCEDURE — 71250 CT THORAX DX C-: CPT | Mod: 26,,, | Performed by: RADIOLOGY

## 2018-02-28 PROCEDURE — 74176 CT ABD & PELVIS W/O CONTRAST: CPT | Mod: 26,,, | Performed by: RADIOLOGY

## 2018-02-28 PROCEDURE — 71250 CT THORAX DX C-: CPT | Mod: TC

## 2018-02-28 PROCEDURE — 74176 CT ABD & PELVIS W/O CONTRAST: CPT | Mod: TC

## 2018-04-02 ENCOUNTER — HOSPITAL ENCOUNTER (OUTPATIENT)
Dept: RADIOLOGY | Facility: HOSPITAL | Age: 68
Discharge: HOME | End: 2018-04-02
Attending: RADIOLOGY
Payer: MEDICARE

## 2018-04-02 DIAGNOSIS — C61 CANCER OF PROSTATE: ICD-10-CM

## 2018-04-02 PROCEDURE — A9503 TC99M MEDRONATE: HCPCS

## 2018-04-02 PROCEDURE — 78306 BONE IMAGING WHOLE BODY: CPT | Mod: 26,,, | Performed by: RADIOLOGY

## 2018-04-06 DIAGNOSIS — C61 CANCER OF PROSTATE: Primary | ICD-10-CM

## 2018-04-24 ENCOUNTER — OFFICE VISIT (OUTPATIENT)
Dept: INTERNAL MEDICINE | Facility: CLINIC | Age: 68
End: 2018-04-24
Payer: MEDICARE

## 2018-04-24 ENCOUNTER — LAB VISIT (OUTPATIENT)
Dept: LAB | Facility: HOSPITAL | Age: 68
End: 2018-04-24
Attending: INTERNAL MEDICINE
Payer: MEDICARE

## 2018-04-24 VITALS
DIASTOLIC BLOOD PRESSURE: 80 MMHG | TEMPERATURE: 99 F | WEIGHT: 315 LBS | BODY MASS INDEX: 46.65 KG/M2 | SYSTOLIC BLOOD PRESSURE: 130 MMHG | HEIGHT: 69 IN | HEART RATE: 63 BPM

## 2018-04-24 DIAGNOSIS — R60.0 EDEMA OF LEFT LOWER EXTREMITY: Primary | ICD-10-CM

## 2018-04-24 DIAGNOSIS — L03.116 CELLULITIS OF LEFT LOWER EXTREMITY: ICD-10-CM

## 2018-04-24 DIAGNOSIS — E66.01 MORBID OBESITY WITH BMI OF 45.0-49.9, ADULT: ICD-10-CM

## 2018-04-24 DIAGNOSIS — I11.9 HYPERTENSIVE HEART DISEASE WITHOUT HEART FAILURE: ICD-10-CM

## 2018-04-24 DIAGNOSIS — R60.0 EDEMA OF LEFT LOWER EXTREMITY: ICD-10-CM

## 2018-04-24 DIAGNOSIS — R73.03 PRE-DIABETES: ICD-10-CM

## 2018-04-24 DIAGNOSIS — N18.30 CKD (CHRONIC KIDNEY DISEASE) STAGE 3, GFR 30-59 ML/MIN: ICD-10-CM

## 2018-04-24 LAB
ALBUMIN SERPL BCP-MCNC: 3.7 G/DL
ANION GAP SERPL CALC-SCNC: 6 MMOL/L
BNP SERPL-MCNC: <10 PG/ML
BUN SERPL-MCNC: 13 MG/DL
CALCIUM SERPL-MCNC: 10 MG/DL
CHLORIDE SERPL-SCNC: 101 MMOL/L
CO2 SERPL-SCNC: 31 MMOL/L
CREAT SERPL-MCNC: 1.4 MG/DL
EST. GFR  (AFRICAN AMERICAN): 58.8 ML/MIN/1.73 M^2
EST. GFR  (NON AFRICAN AMERICAN): 50.9 ML/MIN/1.73 M^2
GLUCOSE SERPL-MCNC: 101 MG/DL
PHOSPHATE SERPL-MCNC: 2.7 MG/DL
POTASSIUM SERPL-SCNC: 4.5 MMOL/L
SODIUM SERPL-SCNC: 138 MMOL/L
URATE SERPL-MCNC: 6.5 MG/DL

## 2018-04-24 PROCEDURE — 99213 OFFICE O/P EST LOW 20 MIN: CPT | Mod: S$GLB,,, | Performed by: INTERNAL MEDICINE

## 2018-04-24 PROCEDURE — 36415 COLL VENOUS BLD VENIPUNCTURE: CPT

## 2018-04-24 PROCEDURE — 84550 ASSAY OF BLOOD/URIC ACID: CPT

## 2018-04-24 PROCEDURE — 99999 PR PBB SHADOW E&M-EST. PATIENT-LVL III: CPT | Mod: PBBFAC,,, | Performed by: INTERNAL MEDICINE

## 2018-04-24 PROCEDURE — 83880 ASSAY OF NATRIURETIC PEPTIDE: CPT

## 2018-04-24 PROCEDURE — 3079F DIAST BP 80-89 MM HG: CPT | Mod: CPTII,S$GLB,, | Performed by: INTERNAL MEDICINE

## 2018-04-24 PROCEDURE — 99499 UNLISTED E&M SERVICE: CPT | Mod: S$GLB,,, | Performed by: INTERNAL MEDICINE

## 2018-04-24 PROCEDURE — 3075F SYST BP GE 130 - 139MM HG: CPT | Mod: CPTII,S$GLB,, | Performed by: INTERNAL MEDICINE

## 2018-04-24 PROCEDURE — 80069 RENAL FUNCTION PANEL: CPT

## 2018-04-24 RX ORDER — DOXYCYCLINE 100 MG/1
100 CAPSULE ORAL EVERY 12 HOURS
Qty: 14 CAPSULE | Refills: 0 | Status: SHIPPED | OUTPATIENT
Start: 2018-04-24 | End: 2018-05-01

## 2018-04-24 NOTE — PROGRESS NOTES
Subjective:       Patient ID: Gregg Mcbride is a 67 y.o. male.    Chief Complaint: Foot Swelling    Patient known to me, last seen about three months ago. Returns urgently c/o new non-traumatic swelling of left foot and lower leg. He is not aware of an insect bite, but has noticed mild tenderness and redness of skin on top of foot. No severe pain, he is able to ambulate normally. No associated fever, chills, sweats, chest pain, palpitations, SOB, cough or hemoptysis. Compliant with daily meds as prescribed including HCTZ and Spironolactone.     PMH:   Hypertension. Ex Stress Echo 3/11 negative for ischemia, LVH with EF 65%.  Hyperlipidemia. LDL 76 .   Morbid Obesity, TSH normal 10/13.   Prostate Cancer diagnosed , followed by Rad/Onc, PSA 0.85 .   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  Left Renal Cell Carcinoma resected.   H/O Lumbar spine injury, work related.   Normocytic Anemia, mild.   Elevated Glucose, HbA1c 5.4% .   Erectile Dysfunction.     Eye exam . Colonoscopy normal  (polyps in past). Flu shot 10/17. Pneumovax . Prevnar .     PSH: Lithotripsy. Ureteroscopic stone removal . Right IHR. Right foot surgery, pin in great toe due to crush injury. Partial Left Nephrectomy .    Social: Tobacco use - occasional cigarette. Alcohol - 1-2 beers, [place] on weekends. , wife is a liver transplant recipient. Adult daughter and son both live locally. Retired  at [employer]. Works part-time cutting grass.     FMH: Father  age 53 of [place], nonsmoker. Mother living age 90, health unknown (does take a lot of medications). Patient is the second of 12 children, some are diabetic.     Allergies: Sulfa - rash.    Medications: list reviewed and reconciled.            Review of Systems   Constitutional: Negative for chills, diaphoresis, fever and unexpected weight change.   Respiratory: Negative for cough, chest tightness and shortness of  breath.    Cardiovascular: Negative for chest pain and palpitations.       Objective:    /80, Pulse 63, Temp 98.9, Wt 324 lbs (unchanged), BMI=48  Physical Exam   Constitutional: He appears well-developed and well-nourished. No distress.   Ambulatory with normal gait using no mobility aid.   HENT:   Nose: Nose normal.   Mouth/Throat: Oropharynx is clear and moist.   Eyes: Conjunctivae are normal. No scleral icterus.   Neck: Normal range of motion. Neck supple. No JVD present.   Cardiovascular: Normal rate, regular rhythm and normal heart sounds.    Pulmonary/Chest: Effort normal and breath sounds normal. No respiratory distress. He has no wheezes. He has no rales.   Musculoskeletal: Normal range of motion.   Slight trace edema right lower extremity. More significant pitting edema of left foot and distal calf with faint erythema and increased warmth, mild tenderness at the foot, no calf tenderness or cords. No mass/cyst in popliteal fossa. Skin generally intact without rash or open sores. No fluctuance, weeping/drainage. Joints supple including left knee, ankle and toes.   Skin: He is not diaphoretic.       Assessment:       1. Edema of left lower extremity    2. Cellulitis of left lower extremity    3. Hypertensive heart disease without heart failure    4. CKD (chronic kidney disease) stage 3, GFR 30-59 ml/min    5. Pre-diabetes    6. Morbid obesity with BMI of 45.0-49.9, adult        Plan:       Edema of left lower extremity  -     Renal function panel; Future; Expected date: 04/24/2018  -     Brain natriuretic peptide; Future; Expected date: 04/24/2018  -     Uric acid; Future; Expected date: 04/24/2018  -     US Lower Extremity Veins Left; Future; Expected date: 04/24/2018    Cellulitis of left lower extremity  -     doxycycline (VIBRAMYCIN) 100 MG Cap; Take 1 capsule (100 mg total) by mouth every 12 (twelve) hours.  Dispense: 14 capsule; Refill: 0    Hypertensive heart disease without heart failure -  clinically stable, BP typically controlled.    CKD (chronic kidney disease) stage 3, GFR 30-59 ml/min - stable on last labs.     Pre-diabetes - stable on diet.     Morbid obesity with BMI of 45.0-49.9, adult - aggravating venous stasis, weight loss encouraged.

## 2018-04-25 ENCOUNTER — HOSPITAL ENCOUNTER (OUTPATIENT)
Dept: RADIOLOGY | Facility: HOSPITAL | Age: 68
Discharge: HOME | End: 2018-04-25
Attending: INTERNAL MEDICINE
Payer: MEDICARE

## 2018-04-25 DIAGNOSIS — R60.0 EDEMA OF LEFT LOWER EXTREMITY: ICD-10-CM

## 2018-04-25 PROCEDURE — 93971 EXTREMITY STUDY: CPT | Mod: TC

## 2018-04-25 PROCEDURE — 93971 EXTREMITY STUDY: CPT | Mod: 26,,, | Performed by: RADIOLOGY

## 2018-05-15 ENCOUNTER — OFFICE VISIT (OUTPATIENT)
Dept: UROLOGY | Facility: CLINIC | Age: 68
End: 2018-05-15
Payer: MEDICARE

## 2018-05-15 VITALS
DIASTOLIC BLOOD PRESSURE: 76 MMHG | HEIGHT: 69 IN | WEIGHT: 315 LBS | BODY MASS INDEX: 46.65 KG/M2 | SYSTOLIC BLOOD PRESSURE: 147 MMHG | HEART RATE: 71 BPM

## 2018-05-15 DIAGNOSIS — N13.5 URETERAL OBSTRUCTION, LEFT: ICD-10-CM

## 2018-05-15 DIAGNOSIS — Z85.528 HISTORY OF KIDNEY CANCER: ICD-10-CM

## 2018-05-15 DIAGNOSIS — N20.0 NEPHROLITHIASIS: ICD-10-CM

## 2018-05-15 DIAGNOSIS — Z90.5 HISTORY OF PARTIAL NEPHRECTOMY: ICD-10-CM

## 2018-05-15 DIAGNOSIS — E66.01 MORBID OBESITY: ICD-10-CM

## 2018-05-15 DIAGNOSIS — N18.9 CHRONIC KIDNEY DISEASE, UNSPECIFIED CKD STAGE: ICD-10-CM

## 2018-05-15 DIAGNOSIS — C61 CANCER OF PROSTATE: Primary | ICD-10-CM

## 2018-05-15 DIAGNOSIS — R97.20 ELEVATED PSA: ICD-10-CM

## 2018-05-15 DIAGNOSIS — Z92.3 HISTORY OF EXTERNAL BEAM RADIATION THERAPY: ICD-10-CM

## 2018-05-15 PROCEDURE — 99999 PR PBB SHADOW E&M-EST. PATIENT-LVL IV: CPT | Mod: PBBFAC,,, | Performed by: UROLOGY

## 2018-05-15 PROCEDURE — 99499 UNLISTED E&M SERVICE: CPT | Mod: S$GLB,,, | Performed by: UROLOGY

## 2018-05-15 PROCEDURE — 99205 OFFICE O/P NEW HI 60 MIN: CPT | Mod: S$GLB,,, | Performed by: UROLOGY

## 2018-05-15 PROCEDURE — 3077F SYST BP >= 140 MM HG: CPT | Mod: CPTII,S$GLB,, | Performed by: UROLOGY

## 2018-05-15 PROCEDURE — 3078F DIAST BP <80 MM HG: CPT | Mod: CPTII,S$GLB,, | Performed by: UROLOGY

## 2018-05-15 RX ORDER — CIPROFLOXACIN 500 MG/1
500 TABLET ORAL 2 TIMES DAILY
Qty: 6 TABLET | Refills: 0 | Status: SHIPPED | OUTPATIENT
Start: 2018-05-15 | End: 2018-05-18

## 2018-05-15 RX ORDER — LIDOCAINE HYDROCHLORIDE 20 MG/ML
JELLY TOPICAL ONCE
Status: CANCELLED | OUTPATIENT
Start: 2018-05-15 | End: 2018-05-15

## 2018-05-15 RX ORDER — LIDOCAINE HYDROCHLORIDE 10 MG/ML
10 INJECTION INFILTRATION; PERINEURAL ONCE
Status: CANCELLED | OUTPATIENT
Start: 2018-05-15 | End: 2018-05-15

## 2018-05-15 NOTE — LETTER
May 15, 2018      Jorge Luis Billingsley Jr., MD  [address]  [place] [address]           [facility] - Urology 4th Floor  [address]  [facility] [address]  Phone: [phone]          Patient: Gregg Mcbride   MR Number: [id number]   YOB: 1950   Date of Visit: 5/15/2018       Dear Dr. Jorge Luis Billingsley Jr.:    Thank you for referring Gregg Mcbride to me for evaluation. Attached you will find relevant portions of my assessment and plan of care.    If you have questions, please do not hesitate to call me. I look forward to following Gregg Mcbride along with you.    Sincerely,    Lance Padron MD    Enclosure  CC:  No Recipients    If you would like to receive this communication electronically, please contact [email or web address] or [phone] to request more information on [facility] access.    For providers and/or their staff who would like to refer a patient to [facility], please contact us through our one-stop-shop provider referral line, [facility], at [phone].    If you feel you have received this communication in error or would no longer like to receive these types of communications, please e-mail [email or web address]

## 2018-05-15 NOTE — PROGRESS NOTES
CC: elevated PSA    Gregg Mcbride is a 67 y.o. man who is here for the evaluation of Elevated PSA    A new pt referred by Dr. Billingsley for rising PSA since XRT combined with ADT back in 2010.  His pre-treatment PSA was 31.  TRUS bx of prostate showed [place] 8 and 9 (4+5) on 1/22/2010.  The patient received an Eligard injection on 2/4/10.  He completed radiotherapy to the prostate and pelvic nodes on 7/1/10. His last Eligard injection was given in August of 2012.    Following his combo therapy, his PSA remained undetectable until 6/2015.  Then starting 2/2016, his PSA is detectable with PSA greater than 0.2.  However, his PSA double time has been about 12 months.  Recent PSA was 0.85 on 2/2/18.    He is referred by Dr. Billingsley for prostate bx and further evaluation of recurrent PSA.    In addition,he has a hx of kidney stone disease with hx of left ureteral stricture.  MAG 3 renal scan doneon 11/29/2011 showed 95% function on the right kidney and 5 % kidney function with abnormal respond to lasix.    Hx of RCC, s/p partial left nephrectomy on 5/10/11 for RCC clear cell type (pT1a No Mo).    He is on flomax for his LUTS and reports no problem with urination.    Denies flank pain, dysuira, hematuria.    Nocturia 2 to 3 x.    Past Medical History:   Diagnosis Date    Arthritis     Cancer 5/2011    Kidney    Chronic kidney disease     stones    History of colon polyps     Hypertension     Morbid obesity with BMI of 50.0-59.9, adult     Prostate cancer      Past Surgical History:   Procedure Laterality Date    COLONOSCOPY N/A 5/19/2016    Procedure: COLONOSCOPY;  Surgeon: Itz Simeon MD;  Location: [facility] ([address] [place] [address]);  Service: Endoscopy;  Laterality: N/A;  Last colonoscopy 2011 with Dr. Simeon    HERNIA REPAIR      KIDNEY SURGERY  2011     Social History   Substance Use Topics    Smoking status: Never Smoker    Smokeless tobacco: Never Used    Alcohol use 0.5 oz/week     1 Standard drinks or  equivalent per week      Comment: socially     Family History   Problem Relation Age of Onset    Diabetes Mother     Hypertension Mother     Coronary artery disease Father     Hypertension Sister     Hypertension Brother      Allergy:  Review of patient's allergies indicates:   Allergen Reactions    Sulfa (sulfonamide antibiotics) Rash     Outpatient Encounter Prescriptions as of 5/15/2018   Medication Sig Dispense Refill    atorvastatin (LIPITOR) 10 MG tablet TAKE ONE TABLET EVERY DAY FOR CHOLESTEROL 90 tablet 1    carvedilol (COREG) 12.5 MG tablet TAKE  (1)  TABLET TWICE A DAY FOR HIGH BLOOD PRESSURE. 180 tablet 1    ciprofloxacin HCl (CIPRO) 500 MG tablet Take 1 tablet (500 mg total) by mouth 2 (two) times daily. 6 tablet 0    hydroCHLOROthiazide (HYDRODIURIL) 25 MG tablet TAKE 1 TABLET BY MOUTH ONCE DAILY. 90 tablet 1    losartan (COZAAR) 100 MG tablet TAKE ONE TABLET BY MOUTH EVERY DAY FOR BLOOD PRESSURE 90 tablet 1    sodium phosphates (FLEET ENEMA) 19-7 gram/118 mL Enem Place 1 enema rectally once. 1 enema 1    spironolactone (ALDACTONE) 25 MG tablet TAKE ONE TABLET BY MOUTH EVERY DAY 90 tablet 1    tadalafil (CIALIS) 20 MG Tab Take 1 tablet (20 mg total) by mouth daily as needed. 3 tablet 5    tamsulosin (FLOMAX) 0.4 mg Cp24 Take 1 capsule (0.4 mg total) by mouth once daily. 90 capsule 3     No facility-administered encounter medications on file as of 5/15/2018.      Review of Systems   General ROS: GENERAL:  No weight gain or loss  SKIN:  No rashes or lacerations  HEAD:  No headaches  EYES:  No exophthalmos, jaundice or blindness  EARS:  No dizziness, tinnitus or hearing loss  NOSE:  No changes in smell  MOUTH & THROAT:  No dyskinetic movements or obvious goiter  CHEST:  No shortness of breath, hyperventilation or cough  CARDIOVASCULAR:  No tachycardia or chest pain  ABDOMEN:  No nausea, vomiting, pain, constipation or diarrhea  URINARY:  No frequency, dysuria or sexual  dysfunction  ENDOCRINE:  No polydipsia, polyuria  MUSCULOSKELETAL:  No pain or stiffness of the joints  NEUROLOGIC:  No weakness, sensory changes, seizures, confusion, memory loss, tremor or other abnormal movements  Physical Exam     Vitals:    05/15/18 0900   BP: (!) 147/76   Pulse: 71     Physical Exam  Genitalia:  Scrotum: no rash or lesion  Normal symmetric epididymis without masses  Normal vas palpated  Normal size, symmetric testicles with no masses   Normal urethral meatus with no discharge  Normal circumcised penis with no lesion   Rectal:  Normal perineum and anus upon inspection.  Normal tone, no masses or tenderness;     LABS:  Lab Results   Component Value Date    PSA 0.05 10/02/2013    PSA 0.06 01/28/2013    PSA 0.07 08/15/2012    PSA 0.06 02/17/2012    PSA 0.11 08/16/2011    PSA 0.20 01/14/2011    PSA 0.28 08/25/2010    PSA 0.79 04/14/2010    PSA 31 (H) 09/12/2009    PSA 29 (H) 02/09/2009    PSADIAG 0.85 02/02/2018    PSADIAG 0.61 08/09/2017    PSADIAG 0.28 06/24/2016    PSADIAG 0.20 02/23/2016    PSADIAG 0.07 06/05/2015    PSADIAG 0.04 05/15/2014     Results for orders placed or performed in visit on 02/02/18   Prostate Specific Antigen, Diagnostic   Result Value Ref Range    PSA DIAGNOSTIC 0.85 0.00 - 4.00 ng/mL   Results for orders placed or performed in visit on 08/09/17   Prostate Specific Antigen, Diagnostic   Result Value Ref Range    PSA DIAGNOSTIC 0.61 0.00 - 4.00 ng/mL   Results for orders placed or performed in visit on 06/24/16   Prostate Specific Antigen, Diagnostic   Result Value Ref Range    PSA DIAGNOSTIC 0.28 0.00 - 4.00 ng/mL     Lab Results   Component Value Date    CREATININE 1.4 04/24/2018    CREATININE 1.5 (H) 02/02/2018    CREATININE 1.6 (H) 10/05/2017     Results for orders placed or performed in visit on 02/02/18   Testosterone   Result Value Ref Range    Testosterone, Total 282 195.0 - 1138.0 ng/dL     Urine Culture, Routine   Date Value Ref Range Status   12/02/2011   Final     MULTIPLE ORGANISMS ISOLATED. NONE IN PREDOMINANCE. REPEAT IF CLINICALLY NECESSARY.     Radiology:  Bone scan 4/2/18  Patient was administered 27.3 millicuries of technetium 99 M labeled MDP intravenously.  There is degenerative activity of the shoulders, wrists, hands, knees, ankles, and feet.  There are bilateral genu varus deformities from severe DJD.  There is poorly functioning left kidney.  Right kidney is normal.  There is no evidence of metastatic disease.  There is mild DJD of the C-spine.    Assessment and Plan:  Gregg was seen today for elevated psa.    Diagnoses and all orders for this visit:    Cancer of prostate  -     Prostate Specific Antigen, Diagnostic; Future    Chronic kidney disease, unspecified CKD stage    History of kidney cancer  -     CT Renal Stone Study ABD Pelvis WO; Future    History of partial nephrectomy  -     CT Renal Stone Study ABD Pelvis WO; Future    Nephrolithiasis  -     CT Renal Stone Study ABD Pelvis WO; Future    Morbid obesity    Elevated PSA  -     Transrectal Ultrasound w/ Biopsy; Future  -     Tissue Specimen To Pathology, Urology; Standing  -     ciprofloxacin HCl (CIPRO) 500 MG tablet; Take 1 tablet (500 mg total) by mouth 2 (two) times daily.  -     sodium phosphates (FLEET ENEMA) 19-7 gram/118 mL Enem; Place 1 enema rectally once.  -     Prostate Specific Antigen, Diagnostic; Future    History of external beam radiation therapy  -     Transrectal Ultrasound w/ Biopsy; Future  -     Tissue Specimen To Pathology, Urology; Standing    Ureteral obstruction, left  -     CT Renal Stone Study ABD Pelvis WO; Future  -     NM Renogram With Lasix; Future    Other orders  -     lidocaine HCL 2% jelly; Apply topically once.  -     lidocaine HCL 10 mg/ml (1%) injection 10 mL; 10 mLs by Infiltration route once.    chemical failure with rising PSA following definite XRT combined with ADT for 2 years for high grade prostate cancer.  No evidence of metastatic disease noted.  Will  proceed with TRUS bx of prostate to r/o any recurrent prostate cancer in the prostate.    The patient will be scheduled for a prostate biopsy.  The risks and benefits of the procedure were discussed with the patient in detail.  The risks include but are not limited to bleeding, infection, pain, bloody ejaculation, and need for further procedures.  The patient was told to stop all blood thinners at least one week prior to the procedure.  The patient will do a fleets enema the AM of the biopsy and take antibiotics beginning the PM prior to the procedure.      In addition, will further evaluate his kidney disease as above.  All questions answered.  Will see him in 1 wk after prostate bx to go over all the findings.      Follow-up:  Follow-up for TRSU bx of prostate.

## 2018-05-15 NOTE — PATIENT INSTRUCTIONS
The patient will be scheduled for a prostate biopsy.  The risks and benefits of the procedure were discussed with the patient in detail.  The risks include but are not limited to bleeding, infection, pain, bloody ejaculation, and need for further procedures.  The patient was told to stop all blood thinners at least one week prior to the procedure.  The patient will do a fleets enema the AM of the biopsy and take antibiotics beginning the PM prior to the procedure.

## 2018-05-30 ENCOUNTER — HOSPITAL ENCOUNTER (OUTPATIENT)
Dept: RADIOLOGY | Facility: HOSPITAL | Age: 68
Discharge: HOME | End: 2018-05-30
Attending: UROLOGY
Payer: MEDICARE

## 2018-05-30 DIAGNOSIS — N13.5 URETERAL OBSTRUCTION, LEFT: ICD-10-CM

## 2018-05-30 PROCEDURE — A9562 TC99M MERTIATIDE: HCPCS

## 2018-05-30 PROCEDURE — 78708 K FLOW/FUNCT IMAGE W/DRUG: CPT | Mod: 26,,, | Performed by: RADIOLOGY

## 2018-06-01 ENCOUNTER — HOSPITAL ENCOUNTER (OUTPATIENT)
Dept: RADIOLOGY | Facility: HOSPITAL | Age: 68
Discharge: HOME | End: 2018-06-01
Attending: UROLOGY
Payer: MEDICARE

## 2018-06-01 ENCOUNTER — OFFICE VISIT (OUTPATIENT)
Dept: INTERNAL MEDICINE | Facility: CLINIC | Age: 68
End: 2018-06-01
Payer: MEDICARE

## 2018-06-01 VITALS
HEART RATE: 55 BPM | HEIGHT: 69 IN | BODY MASS INDEX: 46.65 KG/M2 | WEIGHT: 315 LBS | SYSTOLIC BLOOD PRESSURE: 126 MMHG | DIASTOLIC BLOOD PRESSURE: 80 MMHG

## 2018-06-01 DIAGNOSIS — Z23 NEED FOR DIPHTHERIA-TETANUS-PERTUSSIS (TDAP) VACCINE: ICD-10-CM

## 2018-06-01 DIAGNOSIS — Z90.5 HISTORY OF PARTIAL NEPHRECTOMY: ICD-10-CM

## 2018-06-01 DIAGNOSIS — N20.0 NEPHROLITHIASIS: ICD-10-CM

## 2018-06-01 DIAGNOSIS — I87.2 CHRONIC VENOUS INSUFFICIENCY: ICD-10-CM

## 2018-06-01 DIAGNOSIS — N18.30 CKD (CHRONIC KIDNEY DISEASE) STAGE 3, GFR 30-59 ML/MIN: ICD-10-CM

## 2018-06-01 DIAGNOSIS — E78.5 HYPERLIPIDEMIA LDL GOAL <130: ICD-10-CM

## 2018-06-01 DIAGNOSIS — R60.9 EDEMA, UNSPECIFIED TYPE: ICD-10-CM

## 2018-06-01 DIAGNOSIS — I11.9 HYPERTENSIVE HEART DISEASE WITHOUT HEART FAILURE: Primary | ICD-10-CM

## 2018-06-01 DIAGNOSIS — N13.5 URETERAL OBSTRUCTION, LEFT: ICD-10-CM

## 2018-06-01 DIAGNOSIS — Z85.528 HISTORY OF KIDNEY CANCER: ICD-10-CM

## 2018-06-01 PROCEDURE — 74176 CT ABD & PELVIS W/O CONTRAST: CPT | Mod: 26,,, | Performed by: RADIOLOGY

## 2018-06-01 PROCEDURE — 99499 UNLISTED E&M SERVICE: CPT | Mod: S$GLB,,, | Performed by: INTERNAL MEDICINE

## 2018-06-01 PROCEDURE — 99999 PR PBB SHADOW E&M-EST. PATIENT-LVL III: CPT | Mod: PBBFAC,,, | Performed by: INTERNAL MEDICINE

## 2018-06-01 PROCEDURE — 99214 OFFICE O/P EST MOD 30 MIN: CPT | Mod: S$GLB,,, | Performed by: INTERNAL MEDICINE

## 2018-06-01 PROCEDURE — 74176 CT ABD & PELVIS W/O CONTRAST: CPT | Mod: TC

## 2018-06-01 PROCEDURE — 3074F SYST BP LT 130 MM HG: CPT | Mod: CPTII,S$GLB,, | Performed by: INTERNAL MEDICINE

## 2018-06-01 PROCEDURE — 3079F DIAST BP 80-89 MM HG: CPT | Mod: CPTII,S$GLB,, | Performed by: INTERNAL MEDICINE

## 2018-06-01 RX ORDER — ATORVASTATIN CALCIUM 10 MG/1
TABLET, FILM COATED ORAL
Qty: 90 TABLET | Refills: 1 | Status: SHIPPED | OUTPATIENT
Start: 2018-06-01 | End: 2018-12-20 | Stop reason: SDUPTHER

## 2018-06-01 RX ORDER — SPIRONOLACTONE 25 MG/1
25 TABLET ORAL DAILY
Qty: 90 TABLET | Refills: 1 | Status: SHIPPED | OUTPATIENT
Start: 2018-06-01 | End: 2018-12-20 | Stop reason: SDUPTHER

## 2018-06-01 RX ORDER — LOSARTAN POTASSIUM 100 MG/1
100 TABLET ORAL DAILY
Qty: 90 TABLET | Refills: 1 | Status: SHIPPED | OUTPATIENT
Start: 2018-06-01 | End: 2019-01-24 | Stop reason: SDUPTHER

## 2018-06-01 RX ORDER — CARVEDILOL 12.5 MG/1
TABLET ORAL
Qty: 180 TABLET | Refills: 1 | Status: SHIPPED | OUTPATIENT
Start: 2018-06-01 | End: 2019-06-07 | Stop reason: SDUPTHER

## 2018-06-01 RX ORDER — HYDROCHLOROTHIAZIDE 25 MG/1
25 TABLET ORAL DAILY
Qty: 90 TABLET | Refills: 1 | Status: SHIPPED | OUTPATIENT
Start: 2018-06-01 | End: 2018-10-04 | Stop reason: SDUPTHER

## 2018-06-02 NOTE — PROGRESS NOTES
Subjective:       Patient ID: Gregg Mcbride is a 67 y.o. male.    Chief Complaint: Follow-up    Last seen urgently 5 weeks ago c/o new non-traumatic swelling of left foot and lower leg. He is not aware of an insect bite, but had noticed mild tenderness and redness of skin on top of foot. No severe pain, able to ambulate normally. No associated fever, chills, sweats, chest pain, palpitations, SOB, cough or hemoptysis. Compliant with daily meds as prescribed including HCTZ and Spironolactone. Venous ultrasound was negative for DVT. Labs normal including BNP <10, Uric Acid 6.5 and renal function stable with BUN/CR 13/1.4, GFR 59. Treated for cellulitis with Doxycycline with improvement. Pain and redness subsided, mild swelling persists.     PMH:   Hypertension. Ex Stress Echo 3/11 negative for ischemia, LVH with EF 65%.  Hyperlipidemia. LDL 76 .   Morbid Obesity, TSH normal 10/13.   Prostate Cancer diagnosed , followed by Rad/Onc, PSA 0.85 .   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  Left Renal Cell Carcinoma resected.   H/O Lumbar spine injury, work related.   Normocytic Anemia, mild.   Elevated Glucose, HbA1c 5.4% .   Erectile Dysfunction.     Eye exam . Colonoscopy normal  (polyps in past). Flu shot 10/17. Pneumovax . Prevnar .     PSH: Lithotripsy. Ureteroscopic stone removal . Right IHR. Right foot surgery, pin in great toe due to crush injury. Partial Left Nephrectomy .    Social: Tobacco use - occasional cigarette. Alcohol - 1-2 beers, [place] on weekends. , wife is a liver transplant recipient. Adult daughter and son both live locally. 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     FMH: Father  age 53 of [place], nonsmoker. Mother living age 90, health unknown (does take a lot of medications). Patient is the second of 12 children, some are diabetic.     Allergies: Sulfa - rash.    Medications: list reviewed and  "reconciled. Still often misses the evening dose of Carvedilol.            Review of Systems   Constitutional: Negative for fatigue and fever.   Respiratory: Negative for cough, chest tightness and shortness of breath.    Cardiovascular: Negative for chest pain and palpitations.   Gastrointestinal: Negative for abdominal pain, nausea and vomiting.   Musculoskeletal: Negative for arthralgias and myalgias.   Neurological: Negative for dizziness, syncope, weakness and headaches.       Objective:    /80, Pulse 55, Ht 5' 9", Wt 317 lbs (from 324)  Physical Exam   Constitutional:   Morbidly obese man ambulatory with a normal gait in no distress.    Cardiovascular: Normal rate, regular rhythm and normal heart sounds.    Pulmonary/Chest: Effort normal and breath sounds normal. No respiratory distress. He has no wheezes. He has no rales.   Musculoskeletal: Normal range of motion.   Mild edema of distal left calf and foot with no erythema or tenderness.    Skin: Skin is warm and dry.   Psychiatric: He has a normal mood and affect. His behavior is normal.       Assessment:       1. Hypertensive heart disease without heart failure    2. Edema, unspecified type    3. Hyperlipidemia LDL goal <130    4. CKD (chronic kidney disease) stage 3, GFR 30-59 ml/min    5. Chronic venous insufficiency    6. Need for diphtheria-tetanus-pertussis (Tdap) vaccine        Plan:       Hypertensive heart disease without heart failure - controlled, continue same.   -     losartan (COZAAR) 100 MG tablet; Take 1 tablet (100 mg total) by mouth once daily.  Dispense: 90 tablet; Refill: 1  -     hydroCHLOROthiazide (HYDRODIURIL) 25 MG tablet; Take 1 tablet (25 mg total) by mouth once daily.  Dispense: 90 tablet; Refill: 1  -     carvedilol (COREG) 12.5 MG tablet; TAKE  (1)  TABLET TWICE A DAY FOR HIGH BLOOD PRESSURE.  Dispense: 180 tablet; Refill: 1    Edema, unspecified type  -     spironolactone (ALDACTONE) 25 MG tablet; Take 1 tablet (25 mg " total) by mouth once daily.  Dispense: 90 tablet; Refill: 1    Hyperlipidemia LDL goal <130 - controlled, continue same.   -     atorvastatin (LIPITOR) 10 MG tablet; TAKE ONE TABLET EVERY DAY FOR CHOLESTEROL  Dispense: 90 tablet; Refill: 1    CKD (chronic kidney disease) stage 3, GFR 30-59 ml/min - stable, monitor.     Chronic venous insufficiency - low sodium diet, compression stockings previously prescribed.     Need for diphtheria-tetanus-pertussis (Tdap) vaccine - Tdap today.

## 2018-06-05 ENCOUNTER — TELEPHONE (OUTPATIENT)
Dept: UROLOGY | Facility: CLINIC | Age: 68
End: 2018-06-05

## 2018-06-05 NOTE — TELEPHONE ENCOUNTER
----- Message from Diann Vega MA sent at 6/5/2018 12:37 PM CDT -----  Contact: Home: [phone]   Test Results    Type of Test: PSA   Date of Test: 6/1/18   Communication Preference: Home: [phone]   Additional Information: Please call with results

## 2018-06-07 DIAGNOSIS — N20.0 CALCULUS OF KIDNEY: Primary | ICD-10-CM

## 2018-06-07 NOTE — PROGRESS NOTES
Diagnoses and all orders for this visit:    Calculus of kidney  -     X-Ray Abdomen AP 1 View; Future    he has an appt for TRUS bx on 6/12 and clinic follow up on 6/19.  Please have him do a KUB before his clinic appt.

## 2018-06-12 ENCOUNTER — HOSPITAL ENCOUNTER (OUTPATIENT)
Dept: UROLOGY | Facility: HOSPITAL | Age: 68
Discharge: HOME | End: 2018-06-12
Attending: UROLOGY
Payer: MEDICARE

## 2018-06-12 VITALS
DIASTOLIC BLOOD PRESSURE: 65 MMHG | SYSTOLIC BLOOD PRESSURE: 97 MMHG | RESPIRATION RATE: 20 BRPM | TEMPERATURE: 99 F | HEIGHT: 69 IN | WEIGHT: 315 LBS | HEART RATE: 69 BPM | BODY MASS INDEX: 46.65 KG/M2

## 2018-06-12 DIAGNOSIS — Z92.3 HISTORY OF EXTERNAL BEAM RADIATION THERAPY: ICD-10-CM

## 2018-06-12 DIAGNOSIS — R97.20 ELEVATED PSA: Primary | ICD-10-CM

## 2018-06-12 PROCEDURE — 76872 US TRANSRECTAL: CPT

## 2018-06-12 PROCEDURE — 76942 ECHO GUIDE FOR BIOPSY: CPT | Mod: 26,59,, | Performed by: UROLOGY

## 2018-06-12 PROCEDURE — 88305 TISSUE EXAM BY PATHOLOGIST: CPT | Mod: 26,,, | Performed by: PATHOLOGY

## 2018-06-12 PROCEDURE — 55700 PR BIOPSY OF PROSTATE,NEEDLE/PUNCH: CPT | Mod: ,,, | Performed by: UROLOGY

## 2018-06-12 PROCEDURE — 88341 IMHCHEM/IMCYTCHM EA ADD ANTB: CPT | Mod: 59 | Performed by: PATHOLOGY

## 2018-06-12 PROCEDURE — 76872 US TRANSRECTAL: CPT | Mod: 26,,, | Performed by: UROLOGY

## 2018-06-12 PROCEDURE — 55700 HC BIOPSY OF PROSTATE - NEEDLE OR PUNCH: CPT

## 2018-06-12 PROCEDURE — 88341 IMHCHEM/IMCYTCHM EA ADD ANTB: CPT | Mod: 26,,, | Performed by: PATHOLOGY

## 2018-06-12 PROCEDURE — 76942 ECHO GUIDE FOR BIOPSY: CPT | Mod: 59

## 2018-06-12 PROCEDURE — 88305 TISSUE EXAM BY PATHOLOGIST: CPT | Mod: 59 | Performed by: PATHOLOGY

## 2018-06-12 PROCEDURE — 88342 IMHCHEM/IMCYTCHM 1ST ANTB: CPT | Performed by: PATHOLOGY

## 2018-06-12 PROCEDURE — 88342 IMHCHEM/IMCYTCHM 1ST ANTB: CPT | Mod: 26,,, | Performed by: PATHOLOGY

## 2018-06-12 RX ORDER — LIDOCAINE HYDROCHLORIDE 20 MG/ML
JELLY TOPICAL ONCE
Status: SHIPPED | OUTPATIENT
Start: 2018-06-12

## 2018-06-12 RX ORDER — LIDOCAINE HYDROCHLORIDE 10 MG/ML
10 INJECTION INFILTRATION; PERINEURAL ONCE
Status: COMPLETED | OUTPATIENT
Start: 2018-06-12 | End: 2018-06-12

## 2018-06-12 RX ORDER — LIDOCAINE HYDROCHLORIDE 20 MG/ML
JELLY TOPICAL
Status: COMPLETED | OUTPATIENT
Start: 2018-06-12 | End: 2018-06-12

## 2018-06-12 RX ADMIN — LIDOCAINE HYDROCHLORIDE 10 ML: 10 INJECTION INFILTRATION; PERINEURAL at 10:06

## 2018-06-12 RX ADMIN — LIDOCAINE HYDROCHLORIDE: 20 JELLY TOPICAL at 09:06

## 2018-06-12 NOTE — H&P (VIEW-ONLY)
CC: elevated PSA    Gregg Mcbride is a 67 y.o. man who is here for the evaluation of Elevated PSA    A new pt referred by Dr. Billingsley for rising PSA since XRT combined with ADT back in 2010.  His pre-treatment PSA was 31.  TRUS bx of prostate showed [place] 8 and 9 (4+5) on 1/22/2010.  The patient received an Eligard injection on 2/4/10.  He completed radiotherapy to the prostate and pelvic nodes on 7/1/10. His last Eligard injection was given in August of 2012.    Following his combo therapy, his PSA remained undetectable until 6/2015.  Then starting 2/2016, his PSA is detectable with PSA greater than 0.2.  However, his PSA double time has been about 12 months.  Recent PSA was 0.85 on 2/2/18.    He is referred by Dr. Billingsley for prostate bx and further evaluation of recurrent PSA.    In addition,he has a hx of kidney stone disease with hx of left ureteral stricture.  MAG 3 renal scan doneon 11/29/2011 showed 95% function on the right kidney and 5 % kidney function with abnormal respond to lasix.    Hx of RCC, s/p partial left nephrectomy on 5/10/11 for RCC clear cell type (pT1a No Mo).    He is on flomax for his LUTS and reports no problem with urination.    Denies flank pain, dysuira, hematuria.    Nocturia 2 to 3 x.    Past Medical History:   Diagnosis Date    Arthritis     Cancer 5/2011    Kidney    Chronic kidney disease     stones    History of colon polyps     Hypertension     Morbid obesity with BMI of 50.0-59.9, adult     Prostate cancer      Past Surgical History:   Procedure Laterality Date    COLONOSCOPY N/A 5/19/2016    Procedure: COLONOSCOPY;  Surgeon: Itz Simeon MD;  Location: [facility] ([address] [place] [address]);  Service: Endoscopy;  Laterality: N/A;  Last colonoscopy 2011 with Dr. Simeon    HERNIA REPAIR      KIDNEY SURGERY  2011     Social History   Substance Use Topics    Smoking status: Never Smoker    Smokeless tobacco: Never Used    Alcohol use 0.5 oz/week     1 Standard drinks or  equivalent per week      Comment: socially     Family History   Problem Relation Age of Onset    Diabetes Mother     Hypertension Mother     Coronary artery disease Father     Hypertension Sister     Hypertension Brother      Allergy:  Review of patient's allergies indicates:   Allergen Reactions    Sulfa (sulfonamide antibiotics) Rash     Outpatient Encounter Prescriptions as of 5/15/2018   Medication Sig Dispense Refill    atorvastatin (LIPITOR) 10 MG tablet TAKE ONE TABLET EVERY DAY FOR CHOLESTEROL 90 tablet 1    carvedilol (COREG) 12.5 MG tablet TAKE  (1)  TABLET TWICE A DAY FOR HIGH BLOOD PRESSURE. 180 tablet 1    ciprofloxacin HCl (CIPRO) 500 MG tablet Take 1 tablet (500 mg total) by mouth 2 (two) times daily. 6 tablet 0    hydroCHLOROthiazide (HYDRODIURIL) 25 MG tablet TAKE 1 TABLET BY MOUTH ONCE DAILY. 90 tablet 1    losartan (COZAAR) 100 MG tablet TAKE ONE TABLET BY MOUTH EVERY DAY FOR BLOOD PRESSURE 90 tablet 1    sodium phosphates (FLEET ENEMA) 19-7 gram/118 mL Enem Place 1 enema rectally once. 1 enema 1    spironolactone (ALDACTONE) 25 MG tablet TAKE ONE TABLET BY MOUTH EVERY DAY 90 tablet 1    tadalafil (CIALIS) 20 MG Tab Take 1 tablet (20 mg total) by mouth daily as needed. 3 tablet 5    tamsulosin (FLOMAX) 0.4 mg Cp24 Take 1 capsule (0.4 mg total) by mouth once daily. 90 capsule 3     No facility-administered encounter medications on file as of 5/15/2018.      Review of Systems   General ROS: GENERAL:  No weight gain or loss  SKIN:  No rashes or lacerations  HEAD:  No headaches  EYES:  No exophthalmos, jaundice or blindness  EARS:  No dizziness, tinnitus or hearing loss  NOSE:  No changes in smell  MOUTH & THROAT:  No dyskinetic movements or obvious goiter  CHEST:  No shortness of breath, hyperventilation or cough  CARDIOVASCULAR:  No tachycardia or chest pain  ABDOMEN:  No nausea, vomiting, pain, constipation or diarrhea  URINARY:  No frequency, dysuria or sexual  dysfunction  ENDOCRINE:  No polydipsia, polyuria  MUSCULOSKELETAL:  No pain or stiffness of the joints  NEUROLOGIC:  No weakness, sensory changes, seizures, confusion, memory loss, tremor or other abnormal movements  Physical Exam     Vitals:    05/15/18 0900   BP: (!) 147/76   Pulse: 71     Physical Exam  Genitalia:  Scrotum: no rash or lesion  Normal symmetric epididymis without masses  Normal vas palpated  Normal size, symmetric testicles with no masses   Normal urethral meatus with no discharge  Normal circumcised penis with no lesion   Rectal:  Normal perineum and anus upon inspection.  Normal tone, no masses or tenderness;     LABS:  Lab Results   Component Value Date    PSA 0.05 10/02/2013    PSA 0.06 01/28/2013    PSA 0.07 08/15/2012    PSA 0.06 02/17/2012    PSA 0.11 08/16/2011    PSA 0.20 01/14/2011    PSA 0.28 08/25/2010    PSA 0.79 04/14/2010    PSA 31 (H) 09/12/2009    PSA 29 (H) 02/09/2009    PSADIAG 0.85 02/02/2018    PSADIAG 0.61 08/09/2017    PSADIAG 0.28 06/24/2016    PSADIAG 0.20 02/23/2016    PSADIAG 0.07 06/05/2015    PSADIAG 0.04 05/15/2014     Results for orders placed or performed in visit on 02/02/18   Prostate Specific Antigen, Diagnostic   Result Value Ref Range    PSA DIAGNOSTIC 0.85 0.00 - 4.00 ng/mL   Results for orders placed or performed in visit on 08/09/17   Prostate Specific Antigen, Diagnostic   Result Value Ref Range    PSA DIAGNOSTIC 0.61 0.00 - 4.00 ng/mL   Results for orders placed or performed in visit on 06/24/16   Prostate Specific Antigen, Diagnostic   Result Value Ref Range    PSA DIAGNOSTIC 0.28 0.00 - 4.00 ng/mL     Lab Results   Component Value Date    CREATININE 1.4 04/24/2018    CREATININE 1.5 (H) 02/02/2018    CREATININE 1.6 (H) 10/05/2017     Results for orders placed or performed in visit on 02/02/18   Testosterone   Result Value Ref Range    Testosterone, Total 282 195.0 - 1138.0 ng/dL     Urine Culture, Routine   Date Value Ref Range Status   12/02/2011   Final     MULTIPLE ORGANISMS ISOLATED. NONE IN PREDOMINANCE. REPEAT IF CLINICALLY NECESSARY.     Radiology:  Bone scan 4/2/18  Patient was administered 27.3 millicuries of technetium 99 M labeled MDP intravenously.  There is degenerative activity of the shoulders, wrists, hands, knees, ankles, and feet.  There are bilateral genu varus deformities from severe DJD.  There is poorly functioning left kidney.  Right kidney is normal.  There is no evidence of metastatic disease.  There is mild DJD of the C-spine.    Assessment and Plan:  Gregg was seen today for elevated psa.    Diagnoses and all orders for this visit:    Cancer of prostate  -     Prostate Specific Antigen, Diagnostic; Future    Chronic kidney disease, unspecified CKD stage    History of kidney cancer  -     CT Renal Stone Study ABD Pelvis WO; Future    History of partial nephrectomy  -     CT Renal Stone Study ABD Pelvis WO; Future    Nephrolithiasis  -     CT Renal Stone Study ABD Pelvis WO; Future    Morbid obesity    Elevated PSA  -     Transrectal Ultrasound w/ Biopsy; Future  -     Tissue Specimen To Pathology, Urology; Standing  -     ciprofloxacin HCl (CIPRO) 500 MG tablet; Take 1 tablet (500 mg total) by mouth 2 (two) times daily.  -     sodium phosphates (FLEET ENEMA) 19-7 gram/118 mL Enem; Place 1 enema rectally once.  -     Prostate Specific Antigen, Diagnostic; Future    History of external beam radiation therapy  -     Transrectal Ultrasound w/ Biopsy; Future  -     Tissue Specimen To Pathology, Urology; Standing    Ureteral obstruction, left  -     CT Renal Stone Study ABD Pelvis WO; Future  -     NM Renogram With Lasix; Future    Other orders  -     lidocaine HCL 2% jelly; Apply topically once.  -     lidocaine HCL 10 mg/ml (1%) injection 10 mL; 10 mLs by Infiltration route once.    chemical failure with rising PSA following definite XRT combined with ADT for 2 years for high grade prostate cancer.  No evidence of metastatic disease noted.  Will  proceed with TRUS bx of prostate to r/o any recurrent prostate cancer in the prostate.    The patient will be scheduled for a prostate biopsy.  The risks and benefits of the procedure were discussed with the patient in detail.  The risks include but are not limited to bleeding, infection, pain, bloody ejaculation, and need for further procedures.  The patient was told to stop all blood thinners at least one week prior to the procedure.  The patient will do a fleets enema the AM of the biopsy and take antibiotics beginning the PM prior to the procedure.      In addition, will further evaluate his kidney disease as above.  All questions answered.  Will see him in 1 wk after prostate bx to go over all the findings.      Follow-up:  Follow-up for TRSU bx of prostate.

## 2018-06-12 NOTE — INTERVAL H&P NOTE
The patient has been examined and the H&P has been reviewed:    MAG 3 renal scan  Left kidney is small and hypofunctioning.  Differential function is 92% right kidney, 8% left.    CT RSS  Multiple nonobstructing right renal stones measuring up to 0.5 cm.  Nonobstructing left renal stone measuring 0.5 cm.    Partial left nephrectomy and left renal atrophy.  Stable left renal cysts.    KUB pending    I concur with the findings and no changes have occurred since H&P was written.        There are no hospital problems to display for this patient.

## 2018-06-12 NOTE — PATIENT INSTRUCTIONS

## 2018-06-12 NOTE — PROCEDURES
Procedure Date:  06/12/2018    Procedure:                                                                        Transrectal Ultrasound of the Prostate                                       Transrectal Ultrasound Guided Prostate Biopsy                                - With Pudendal Nerve Block                                                                                      Pre-OP Diagnosis:                                                                 Elevated PSA                                              Post-OP Diagnosis:                                                                Awaiting final pathology                                                Anesthesia:                                                                       Anesthesia Administered:                                                     Intrarectal instillation of 10 mL 2% lidocaine (Xylocaine) jelly.       Findings:                                                                         --- Transrectal Ultrasound of the Prostate ---                               Prostate measurements.                                                                                               PSA:   Lab Results   Component Value Date    PSA 0.05 10/02/2013    PSADIAG 0.85 06/01/2018            - Volume:32 gm.           PSA Density: 0.03                                                         no calcification in the prostate   S/p XRT with fiduciary markers                       Description of Procedure:                                                         Informed Consent:                                                            - Risks, benefits and alternatives of procedure discussed with               patient and informed consent obtained.                                       Patient Position:                                                            - Left lateral.                                                              ---  Transrectal Ultrasound of the Prostate ---                               Instruments:                                                                 - Dain.                                                           --- Transrectal U/S Biopsy ---                                               Instruments:                                                                 - Spring-loaded gun used for biopsy.                                         Procedure Details:                                                           Biopsy Core Details:                                                         - Twelve core(s) in total.                                                   - Cores Taken From: Right apex x 2, right mid prostate x 2, right            base x 2, left apex x 2, left mid prostate x 2 and left base x 2.            Estimated Blood Loss:                                                        - Minimal.                                                                   Pathology Specimen:                                                          - The specimen sent.                                                    Complications:                                                                    No immediate complications.                                             Post-OP Plan:                                                                                            Patient was discharged home in a stable condition.         Medications: finish off cipro given.         Will call him with the bx result.          If fever or unable to void, RTC or emergency room immediately.                                           RTC 6 months with PSA.

## 2018-06-19 ENCOUNTER — OFFICE VISIT (OUTPATIENT)
Dept: UROLOGY | Facility: CLINIC | Age: 68
End: 2018-06-19
Payer: MEDICARE

## 2018-06-19 ENCOUNTER — HOSPITAL ENCOUNTER (OUTPATIENT)
Dept: RADIOLOGY | Facility: HOSPITAL | Age: 68
Discharge: HOME | End: 2018-06-19
Attending: UROLOGY
Payer: MEDICARE

## 2018-06-19 VITALS
HEART RATE: 69 BPM | HEIGHT: 69 IN | WEIGHT: 315 LBS | SYSTOLIC BLOOD PRESSURE: 157 MMHG | DIASTOLIC BLOOD PRESSURE: 87 MMHG | BODY MASS INDEX: 46.65 KG/M2

## 2018-06-19 DIAGNOSIS — Z90.5 HISTORY OF PARTIAL NEPHRECTOMY: ICD-10-CM

## 2018-06-19 DIAGNOSIS — N20.0 NEPHROLITHIASIS: Primary | ICD-10-CM

## 2018-06-19 DIAGNOSIS — C61 CANCER OF PROSTATE: ICD-10-CM

## 2018-06-19 DIAGNOSIS — N20.0 CALCULUS OF KIDNEY: ICD-10-CM

## 2018-06-19 DIAGNOSIS — R97.20 ELEVATED PSA: ICD-10-CM

## 2018-06-19 PROCEDURE — 99999 PR PBB SHADOW E&M-EST. PATIENT-LVL III: CPT | Mod: PBBFAC,,, | Performed by: UROLOGY

## 2018-06-19 PROCEDURE — 74018 RADEX ABDOMEN 1 VIEW: CPT | Mod: TC

## 2018-06-19 PROCEDURE — 3077F SYST BP >= 140 MM HG: CPT | Mod: CPTII,S$GLB,, | Performed by: UROLOGY

## 2018-06-19 PROCEDURE — 74018 RADEX ABDOMEN 1 VIEW: CPT | Mod: 26,,, | Performed by: RADIOLOGY

## 2018-06-19 PROCEDURE — 3079F DIAST BP 80-89 MM HG: CPT | Mod: CPTII,S$GLB,, | Performed by: UROLOGY

## 2018-06-19 PROCEDURE — 99215 OFFICE O/P EST HI 40 MIN: CPT | Mod: S$GLB,,, | Performed by: UROLOGY

## 2018-06-19 RX ORDER — POTASSIUM CITRATE 10 MEQ/1
10 TABLET, EXTENDED RELEASE ORAL NIGHTLY
Qty: 30 TABLET | Refills: 11 | Status: SHIPPED | OUTPATIENT
Start: 2018-06-19 | End: 2018-10-26 | Stop reason: SDUPTHER

## 2018-06-19 NOTE — LETTER
June 19, 2018      Cecilia Moreno MD  [address]  [facility] [address]           [facility] - Urology 4th Floor  [address]  [facility] [address]  Phone: [phone]          Patient: Gregg Mcbride   MR Number: [id number]   YOB: 1950   Date of Visit: 6/19/2018       Dear Dr. Cecilia Moreno:    Thank you for referring Gregg Mcbride to me for evaluation. Attached you will find relevant portions of my assessment and plan of care.    If you have questions, please do not hesitate to call me. I look forward to following Gregg Mcbride along with you.    Sincerely,    Lance Padron MD    Enclosure  CC:  No Recipients    If you would like to receive this communication electronically, please contact [email or web address] or [phone] to request more information on [employer] Link access.    For providers and/or their staff who would like to refer a patient to [facility], please contact us through our one-stop-shop provider referral line, Stephanie Domingo, at [phone].    If you feel you have received this communication in error or would no longer like to receive these types of communications, please e-mail [email or web address]

## 2018-06-19 NOTE — PROGRESS NOTES
CC: s/p TRUS bx of prostate elevated PSA. Right kidney stones    Gregg Mcbride is a 67 y.o. man who is here for the evaluation of Prostate Cancer (rtc to disucss prostate biopsy results from 6/12/18); renal cancer (hx of RCC with partial left nephrectomy -review mag 3 ); and Nephrolithiasis (review ctscan and xray )    I did  TRUS bx of prostate.on 6/12/18 for rising PSA since XRT combined with ADT back in 2010.  His pre-treatment PSA was 31.  TRUS bx of prostate showed [place] 8 and 9 (4+5) on 1/22/2010.  The patient received an Eligard injection on 2/4/10.  He completed radiotherapy to the prostate and pelvic nodes on 7/1/10. His last Eligard injection was given in August of 2012.    Following his combo therapy, his PSA remained undetectable until 6/2015.  Then starting 2/2016, his PSA is detectable with PSA greater than 0.2.  However, his PSA double time has been about 12 months.  Recent PSA was 0.85 on 2/2/18.    He is referred by Dr. Billingsley for prostate bx and further evaluation of recurrent PSA.    In addition,he has a hx of kidney stone disease with hx of left ureteral stricture.  MAG 3 renal scan doneon 11/29/2011 showed 95% function on the right kidney and 5 % kidney function with abnormal respond to lasix.    Hx of RCC, s/p partial left nephrectomy on 5/10/11 for RCC clear cell type (pT1a No Mo).    He is on flomax for his LUTS and reports no problem with urination.    Denies flank pain, dysuira, hematuria.    Nocturia 2 to 3 x.    He is here with CT RSS and KUB for right kidney stone disease.    Past Medical History:   Diagnosis Date    Arthritis     Cancer 5/2011    Kidney    Chronic kidney disease     stones    History of colon polyps     Hypertension     Morbid obesity with BMI of 50.0-59.9, adult     Prostate cancer      Past Surgical History:   Procedure Laterality Date    COLONOSCOPY N/A 5/19/2016    Procedure: COLONOSCOPY;  Surgeon: Itz Simeon MD;  Location: [facility] ([address] [place] [address]);   Service: Endoscopy;  Laterality: N/A;  Last colonoscopy 2011 with Dr. Simeon    HERNIA REPAIR      KIDNEY SURGERY  2011     Social History   Substance Use Topics    Smoking status: Never Smoker    Smokeless tobacco: Never Used    Alcohol use 0.5 oz/week     1 Standard drinks or equivalent per week      Comment: socially     Family History   Problem Relation Age of Onset    Diabetes Mother     Hypertension Mother     Coronary artery disease Father     Hypertension Sister     Hypertension Brother      Allergy:  Review of patient's allergies indicates:   Allergen Reactions    Sulfa (sulfonamide antibiotics) Rash     Outpatient Encounter Prescriptions as of 6/19/2018   Medication Sig Dispense Refill    atorvastatin (LIPITOR) 10 MG tablet TAKE ONE TABLET EVERY DAY FOR CHOLESTEROL 90 tablet 1    carvedilol (COREG) 12.5 MG tablet TAKE  (1)  TABLET TWICE A DAY FOR HIGH BLOOD PRESSURE. 180 tablet 1    hydroCHLOROthiazide (HYDRODIURIL) 25 MG tablet Take 1 tablet (25 mg total) by mouth once daily. 90 tablet 1    losartan (COZAAR) 100 MG tablet Take 1 tablet (100 mg total) by mouth once daily. 90 tablet 1    spironolactone (ALDACTONE) 25 MG tablet Take 1 tablet (25 mg total) by mouth once daily. 90 tablet 1    tadalafil (CIALIS) 20 MG Tab Take 1 tablet (20 mg total) by mouth daily as needed. 3 tablet 5    tamsulosin (FLOMAX) 0.4 mg Cp24 Take 1 capsule (0.4 mg total) by mouth once daily. 90 capsule 3    diphth,pertus,acell,,tetanus (BOOSTRIX TDAP) 2.5-8-5 Lf-mcg-Lf/0.5mL Syrg injection Inject into the muscle. 0.5 mL 0    potassium citrate (UROCIT-K 10) 10 mEq (1,080 mg) TbSR Take 1 tablet (10 mEq total) by mouth every evening. 30 tablet 11     Facility-Administered Encounter Medications as of 6/19/2018   Medication Dose Route Frequency Provider Last Rate Last Dose    lidocaine HCL 2% jelly   Topical (Top) Once Lance Padron MD         Review of Systems   General ROS: GENERAL:  No weight gain or loss  SKIN:   No rashes or lacerations  HEAD:  No headaches  EYES:  No exophthalmos, jaundice or blindness  EARS:  No dizziness, tinnitus or hearing loss  NOSE:  No changes in smell  MOUTH & THROAT:  No dyskinetic movements or obvious goiter  CHEST:  No shortness of breath, hyperventilation or cough  CARDIOVASCULAR:  No tachycardia or chest pain  ABDOMEN:  No nausea, vomiting, pain, constipation or diarrhea  URINARY:  No frequency, dysuria or sexual dysfunction  ENDOCRINE:  No polydipsia, polyuria  MUSCULOSKELETAL:  No pain or stiffness of the joints  NEUROLOGIC:  No weakness, sensory changes, seizures, confusion, memory loss, tremor or other abnormal movements  Physical Exam     Vitals:    06/19/18 1606   BP: (!) 157/87   Pulse: 69     Physical Exam  Genitalia:  Scrotum: no rash or lesion  Normal symmetric epididymis without masses  Normal vas palpated  Normal size, symmetric testicles with no masses   Normal urethral meatus with no discharge  Normal circumcised penis with no lesion   Rectal:  Normal perineum and anus upon inspection.  Normal tone, no masses or tenderness;     LABS:  Lab Results   Component Value Date    PSA 0.05 10/02/2013    PSA 0.06 01/28/2013    PSA 0.07 08/15/2012    PSA 0.06 02/17/2012    PSA 0.11 08/16/2011    PSA 0.20 01/14/2011    PSA 0.28 08/25/2010    PSA 0.79 04/14/2010    PSA 31 (H) 09/12/2009    PSA 29 (H) 02/09/2009    PSADIAG 0.85 06/01/2018    PSADIAG 0.85 02/02/2018    PSADIAG 0.61 08/09/2017    PSADIAG 0.28 06/24/2016    PSADIAG 0.20 02/23/2016    PSADIAG 0.07 06/05/2015    PSADIAG 0.04 05/15/2014     Results for orders placed or performed in visit on 06/01/18   Prostate Specific Antigen, Diagnostic   Result Value Ref Range    PSA DIAGNOSTIC 0.85 0.00 - 4.00 ng/mL   Results for orders placed or performed in visit on 02/02/18   Prostate Specific Antigen, Diagnostic   Result Value Ref Range    PSA DIAGNOSTIC 0.85 0.00 - 4.00 ng/mL   Results for orders placed or performed in visit on 08/09/17    Prostate Specific Antigen, Diagnostic   Result Value Ref Range    PSA DIAGNOSTIC 0.61 0.00 - 4.00 ng/mL     Lab Results   Component Value Date    CREATININE 1.4 04/24/2018    CREATININE 1.5 (H) 02/02/2018    CREATININE 1.6 (H) 10/05/2017     Results for orders placed or performed in visit on 02/02/18   Testosterone   Result Value Ref Range    Testosterone, Total 282 195.0 - 1138.0 ng/dL     Urine Culture, Routine   Date Value Ref Range Status   12/02/2011   Final    MULTIPLE ORGANISMS ISOLATED. NONE IN PREDOMINANCE. REPEAT IF CLINICALLY NECESSARY.     Radiology:  Bone scan 4/2/18  Patient was administered 27.3 millicuries of technetium 99 M labeled MDP intravenously.  There is degenerative activity of the shoulders, wrists, hands, knees, ankles, and feet.  There are bilateral genu varus deformities from severe DJD.  There is poorly functioning left kidney.  Right kidney is normal.  There is no evidence of metastatic disease.  There is mild DJD of the C-spine.    MAG 3 renal scan 4/121/18  1.  Normal function and response to lasix of  the right kidney.     2. Persistent, severely decreased function of the left kidney with poor  response to lasix.  Not significantly changed compared to prior scan.     3. Differential function left kidney 12%, right kidney 88%. Not changed   Significantly when compared to last time.    CT RSS 6/1/18  Multiple nonobstructing right renal stones measuring up to 0.5 cm.  Nonobstructing left renal stone measuring 0.5 cm.    Partial left nephrectomy and left renal atrophy.  Stable left renal cysts.    Atelectasis versus parenchymal scarring of the lingula.    Assessment and Plan:  Gregg was seen today for prostate cancer, renal cancer and nephrolithiasis.    Diagnoses and all orders for this visit:    Nephrolithiasis  -     potassium citrate (UROCIT-K 10) 10 mEq (1,080 mg) TbSR; Take 1 tablet (10 mEq total) by mouth every evening.  -     Basic metabolic panel; Future  -     Uric acid;  Future    History of partial nephrectomy    Elevated PSA  -     Prostate Specific Antigen, Diagnostic; Future    Cancer of prostate    chemical failure with rising PSA following definite XRT combined with ADT for 2 years for high grade prostate cancer.  No evidence of metastatic disease noted.  Prostate bx is pending.  I talked to Dr. Willett in pathology today.    For his kidney stone, I will follow up with serial CT RSS, since I was not able to see any stone on KUB due to bowel gas and stools.  Start Urocit K once a day for his kidney stone disease but can't be aggressive in treatment due to his decreased renal function.  I spent 40 minutes with the patient of which more than half was spent in direct consultation with the patient in regards to our treatment and plan.      Follow-up:  Follow-up in about 3 months (around 9/19/2018) for PSA, Uric acid, BMP.

## 2018-06-20 ENCOUNTER — TELEPHONE (OUTPATIENT)
Dept: UROLOGY | Facility: CLINIC | Age: 68
End: 2018-06-20

## 2018-06-20 NOTE — TELEPHONE ENCOUNTER
TRUS bx of prostate 6/12/18    SPECIMEN  1) Prostate biopsy, left apex.  2) Prostate biopsy, left middle.  3) Prostate biopsy, left base.  4) Prostate biopsy, right apex.  5) Prostate biopsy, right middle.  6) Prostate biopsy, right base.  FINAL PATHOLOGIC DIAGNOSIS  PROSTATE BIOPSIES 1, 2, 3, 4, 5, AND 6:  NO DISTINCT RESIDUAL CARCINOMA IDENTIFIED  PICTURE CONSISTENT WITH PRIOR THERAPY    The results were given to pt.  Will see him in 3 months as scheduled before.

## 2018-09-05 DIAGNOSIS — C61 CANCER OF PROSTATE: Primary | ICD-10-CM

## 2018-09-05 RX ORDER — TAMSULOSIN HYDROCHLORIDE 0.4 MG/1
CAPSULE ORAL
Qty: 90 CAPSULE | Refills: 3 | Status: SHIPPED | OUTPATIENT
Start: 2018-09-05 | End: 2018-10-26 | Stop reason: SDUPTHER

## 2018-10-04 DIAGNOSIS — I11.9 HYPERTENSIVE HEART DISEASE WITHOUT HEART FAILURE: ICD-10-CM

## 2018-10-04 RX ORDER — HYDROCHLOROTHIAZIDE 25 MG/1
TABLET ORAL
Qty: 90 TABLET | Refills: 0 | Status: SHIPPED | OUTPATIENT
Start: 2018-10-04 | End: 2019-03-07 | Stop reason: SDUPTHER

## 2018-10-25 ENCOUNTER — LAB VISIT (OUTPATIENT)
Dept: LAB | Facility: HOSPITAL | Age: 68
End: 2018-10-25
Attending: UROLOGY
Payer: MEDICARE

## 2018-10-25 DIAGNOSIS — R97.20 ELEVATED PSA: ICD-10-CM

## 2018-10-25 DIAGNOSIS — N20.0 NEPHROLITHIASIS: ICD-10-CM

## 2018-10-25 LAB
ANION GAP SERPL CALC-SCNC: 9 MMOL/L
BUN SERPL-MCNC: 17 MG/DL
CALCIUM SERPL-MCNC: 10 MG/DL
CHLORIDE SERPL-SCNC: 100 MMOL/L
CO2 SERPL-SCNC: 28 MMOL/L
COMPLEXED PSA SERPL-MCNC: 1.3 NG/ML
CREAT SERPL-MCNC: 1.4 MG/DL
EST. GFR  (AFRICAN AMERICAN): 58.4 ML/MIN/1.73 M^2
EST. GFR  (NON AFRICAN AMERICAN): 50.5 ML/MIN/1.73 M^2
GLUCOSE SERPL-MCNC: 130 MG/DL
POTASSIUM SERPL-SCNC: 4.1 MMOL/L
SODIUM SERPL-SCNC: 137 MMOL/L
URATE SERPL-MCNC: 6.6 MG/DL

## 2018-10-25 PROCEDURE — 36415 COLL VENOUS BLD VENIPUNCTURE: CPT

## 2018-10-25 PROCEDURE — 80048 BASIC METABOLIC PNL TOTAL CA: CPT

## 2018-10-25 PROCEDURE — 84153 ASSAY OF PSA TOTAL: CPT

## 2018-10-25 PROCEDURE — 84550 ASSAY OF BLOOD/URIC ACID: CPT

## 2018-10-26 ENCOUNTER — OFFICE VISIT (OUTPATIENT)
Dept: UROLOGY | Facility: CLINIC | Age: 68
End: 2018-10-26
Payer: MEDICARE

## 2018-10-26 VITALS — RESPIRATION RATE: 18 BRPM | WEIGHT: 315 LBS | BODY MASS INDEX: 46.65 KG/M2 | HEIGHT: 69 IN

## 2018-10-26 DIAGNOSIS — R97.20 ELEVATED PSA: ICD-10-CM

## 2018-10-26 DIAGNOSIS — Z90.5 HISTORY OF PARTIAL NEPHRECTOMY: ICD-10-CM

## 2018-10-26 DIAGNOSIS — N20.0 CALCULUS OF KIDNEY: ICD-10-CM

## 2018-10-26 DIAGNOSIS — N20.0 NEPHROLITHIASIS: ICD-10-CM

## 2018-10-26 DIAGNOSIS — Z85.528 HISTORY OF KIDNEY CANCER: ICD-10-CM

## 2018-10-26 DIAGNOSIS — C61 CANCER OF PROSTATE: Primary | ICD-10-CM

## 2018-10-26 DIAGNOSIS — E66.01 MORBID OBESITY: ICD-10-CM

## 2018-10-26 DIAGNOSIS — N52.9 ED (ERECTILE DYSFUNCTION) OF ORGANIC ORIGIN: ICD-10-CM

## 2018-10-26 DIAGNOSIS — N18.30 CHRONIC KIDNEY DISEASE, STAGE III (MODERATE): ICD-10-CM

## 2018-10-26 PROCEDURE — 99215 OFFICE O/P EST HI 40 MIN: CPT | Mod: S$PBB,,, | Performed by: UROLOGY

## 2018-10-26 PROCEDURE — 99214 OFFICE O/P EST MOD 30 MIN: CPT | Mod: PBBFAC | Performed by: UROLOGY

## 2018-10-26 PROCEDURE — 99999 PR PBB SHADOW E&M-EST. PATIENT-LVL IV: CPT | Mod: PBBFAC,,, | Performed by: UROLOGY

## 2018-10-26 PROCEDURE — 1101F PT FALLS ASSESS-DOCD LE1/YR: CPT | Mod: CPTII,,, | Performed by: UROLOGY

## 2018-10-26 RX ORDER — POTASSIUM CITRATE 10 MEQ/1
10 TABLET, EXTENDED RELEASE ORAL NIGHTLY
Qty: 30 TABLET | Refills: 11 | Status: SHIPPED | OUTPATIENT
Start: 2018-10-26 | End: 2019-04-29 | Stop reason: SDUPTHER

## 2018-10-26 RX ORDER — SILDENAFIL 100 MG/1
100 TABLET, FILM COATED ORAL DAILY PRN
Qty: 10 TABLET | Refills: 12 | Status: SHIPPED | OUTPATIENT
Start: 2018-10-26 | End: 2019-08-30 | Stop reason: SDUPTHER

## 2018-10-26 RX ORDER — TAMSULOSIN HYDROCHLORIDE 0.4 MG/1
1 CAPSULE ORAL DAILY
Qty: 90 CAPSULE | Refills: 3 | Status: SHIPPED | OUTPATIENT
Start: 2018-10-26 | End: 2019-04-29 | Stop reason: SDUPTHER

## 2018-10-26 NOTE — PATIENT INSTRUCTIONS
Lab Results   Component Value Date    PSA 0.05 10/02/2013    PSA 0.06 01/28/2013    PSA 0.07 08/15/2012    PSADIAG 1.3 10/25/2018    PSADIAG 0.85 06/01/2018    PSADIAG 0.85 02/02/2018

## 2018-10-30 ENCOUNTER — TELEPHONE (OUTPATIENT)
Dept: UROLOGY | Facility: CLINIC | Age: 68
End: 2018-10-30

## 2018-10-30 NOTE — TELEPHONE ENCOUNTER
----- Message from Jorge Luis Billingsley Jr., MD sent at 10/29/2018  7:57 PM CDT -----  thanks for seeing him Lance.  I still  would probably not start Eligard at this time.  His overall PSA is very low and with a negative bone scan, I would just continue to monitor his PSA.    ----- Message -----  From: Lance Padron MD  Sent: 10/26/2018  12:19 PM  To: Jorge Luis Billingsley Jr., MD

## 2018-10-30 NOTE — TELEPHONE ENCOUNTER
I informed the pt of what Dr. Billingsley recommended ( holding off Lupron injection).  Based on PSA rise with shortening doubling time, pt feels that he would like to proceed with ADT.  I will go ahead and start him on ADT with lupron injection on 11/9/18 as scheduled.  Most likely I will follow up with intermittent hormonal therapy since there is no evidence of bone mets.

## 2018-11-09 ENCOUNTER — CLINICAL SUPPORT (OUTPATIENT)
Dept: UROLOGY | Facility: CLINIC | Age: 68
End: 2018-11-09
Payer: MEDICARE

## 2018-11-09 DIAGNOSIS — R97.20 ELEVATED PSA: ICD-10-CM

## 2018-11-09 DIAGNOSIS — C61 CANCER OF PROSTATE: Primary | ICD-10-CM

## 2018-11-09 PROCEDURE — 96402 CHEMO HORMON ANTINEOPL SQ/IM: CPT | Mod: ,,, | Performed by: UROLOGY

## 2018-11-09 NOTE — PROGRESS NOTES
CC: prostate cancer, rising PSA, Right kidney stones    Gregg Mcbride is a 68 y.o. man who is here for the evaluation of No chief complaint on file.    I did his TRUS bx of prostate on 6/12/18 for rising PSA since XRT combined with ADT back in 2010.  FINAL PATHOLOGIC DIAGNOSIS  PROSTATE BIOPSIES 1, 2, 3, 4, 5, AND 6:  NO DISTINCT RESIDUAL CARCINOMA IDENTIFIED  PICTURE CONSISTENT WITH PRIOR THERAPY    His pre-treatment PSA was 31.  TRUS bx of prostate showed [place] 8 and 9 (4+5) on 1/22/2010.  The patient received an Eligard injection on 2/4/10.  He completed radiotherapy to the prostate and pelvic nodes on 7/1/10. His last Eligard injection was given in August of 2012.    Following his combo therapy, his PSA remained undetectable until 6/2015.  Then starting 2/2016, his PSA is detectable with PSA greater than 0.2.  However, his PSA double time has been about 12 months.  His PSA has risen to 1.3 on 10/25/18 from 0.85 on 2/2/18.    In addition,he has a hx of kidney stone disease with hx of left ureteral stricture.  MAG 3 renal scan doneon 11/29/2011 showed 95% function on the right kidney and 5 % kidney function with abnormal respond to lasix.    Hx of RCC, s/p partial left nephrectomy on 5/10/11 for RCC clear cell type (pT1a No Mo).    He is on flomax for his LUTS and reports no problem with urination.    Denies flank pain, dysuira, hematuria.    Nocturia 2 to 3 x.    He is here with CT RSS and blood tests.    Past Medical History:   Diagnosis Date    Arthritis     Cancer 5/2011    Kidney    Chronic kidney disease     stones    History of colon polyps     Hypertension     Morbid obesity with BMI of 50.0-59.9, adult     Prostate cancer      Past Surgical History:   Procedure Laterality Date    COLONOSCOPY N/A 5/19/2016    Procedure: COLONOSCOPY;  Surgeon: Itz Simeon MD;  Location: [facility] ([address] [place] [address]);  Service: Endoscopy;  Laterality: N/A;  Last colonoscopy 2011 with Dr. Simeon    COLONOSCOPY N/A  5/19/2016    Performed by Itz Simeon MD at [facility] (4TH FLR)    HERNIA REPAIR      KIDNEY SURGERY  2011     Social History     Tobacco Use    Smoking status: Never Smoker    Smokeless tobacco: Never Used   Substance Use Topics    Alcohol use: Yes     Alcohol/week: 0.5 oz     Types: 1 Standard drinks or equivalent per week     Comment: socially    Drug use: No     Family History   Problem Relation Age of Onset    Diabetes Mother     Hypertension Mother     Coronary artery disease Father     Hypertension Sister     Hypertension Brother      Allergy:  Review of patient's allergies indicates:   Allergen Reactions    Sulfa (sulfonamide antibiotics) Rash     Outpatient Encounter Medications as of 11/9/2018   Medication Sig Dispense Refill    atorvastatin (LIPITOR) 10 MG tablet TAKE ONE TABLET EVERY DAY FOR CHOLESTEROL 90 tablet 1    carvedilol (COREG) 12.5 MG tablet TAKE  (1)  TABLET TWICE A DAY FOR HIGH BLOOD PRESSURE. 180 tablet 1    diphth,pertus,acell,,tetanus (BOOSTRIX TDAP) 2.5-8-5 Lf-mcg-Lf/0.5mL Syrg injection Inject into the muscle. 0.5 mL 0    hydroCHLOROthiazide (HYDRODIURIL) 25 MG tablet TAKE ONE TABLET BY MOUTH EVERY DAY. 90 tablet 0    losartan (COZAAR) 100 MG tablet Take 1 tablet (100 mg total) by mouth once daily. 90 tablet 1    potassium citrate (UROCIT-K 10) 10 mEq (1,080 mg) TbSR Take 1 tablet (10 mEq total) by mouth every evening. 30 tablet 11    sildenafil (VIAGRA) 100 MG tablet Take 1 tablet (100 mg total) by mouth daily as needed for Erectile Dysfunction. 10 tablet 12    spironolactone (ALDACTONE) 25 MG tablet Take 1 tablet (25 mg total) by mouth once daily. 90 tablet 1    tamsulosin (FLOMAX) 0.4 mg Cap Take 1 capsule (0.4 mg total) by mouth once daily. 90 capsule 3     Facility-Administered Encounter Medications as of 11/9/2018   Medication Dose Route Frequency Provider Last Rate Last Dose    leuprolide (6 month) SyKt 45 mg  45 mg Intramuscular Q6 Months Lance GOODWIN  MD Vito        lidocaine HCL 2% jelly   Topical (Top) Once Lance Padron MD         Review of Systems   General ROS: GENERAL:  No weight gain or loss  SKIN:  No rashes or lacerations  HEAD:  No headaches  EYES:  No exophthalmos, jaundice or blindness  EARS:  No dizziness, tinnitus or hearing loss  NOSE:  No changes in smell  MOUTH & THROAT:  No dyskinetic movements or obvious goiter  CHEST:  No shortness of breath, hyperventilation or cough  CARDIOVASCULAR:  No tachycardia or chest pain  ABDOMEN:  No nausea, vomiting, pain, constipation or diarrhea  URINARY:  No frequency, dysuria or sexual dysfunction  ENDOCRINE:  No polydipsia, polyuria  MUSCULOSKELETAL:  No pain or stiffness of the joints  NEUROLOGIC:  No weakness, sensory changes, seizures, confusion, memory loss, tremor or other abnormal movements  Physical Exam     There were no vitals filed for this visit.  Physical Exam  Genitalia:  Scrotum: no rash or lesion  Normal symmetric epididymis without masses  Normal vas palpated  Normal size, symmetric testicles with no masses   Normal urethral meatus with no discharge  Normal circumcised penis with no lesion   Rectal:  Normal perineum and anus upon inspection.  Normal tone, no masses or tenderness;     LABS:  Lab Results   Component Value Date    PSA 0.05 10/02/2013    PSA 0.06 01/28/2013    PSA 0.07 08/15/2012    PSA 0.06 02/17/2012    PSA 0.11 08/16/2011    PSA 0.20 01/14/2011    PSA 0.28 08/25/2010    PSA 0.79 04/14/2010    PSA 31 (H) 09/12/2009    PSA 29 (H) 02/09/2009    PSADIAG 1.3 10/25/2018    PSADIAG 0.85 06/01/2018    PSADIAG 0.85 02/02/2018    PSADIAG 0.61 08/09/2017    PSADIAG 0.28 06/24/2016    PSADIAG 0.20 02/23/2016    PSADIAG 0.07 06/05/2015    PSADIAG 0.04 05/15/2014     Results for orders placed or performed in visit on 10/25/18   Prostate Specific Antigen, Diagnostic   Result Value Ref Range    PSA DIAGNOSTIC 1.3 0.00 - 4.00 ng/mL   Results for orders placed or performed in visit on 06/01/18    Prostate Specific Antigen, Diagnostic   Result Value Ref Range    PSA DIAGNOSTIC 0.85 0.00 - 4.00 ng/mL   Results for orders placed or performed in visit on 02/02/18   Prostate Specific Antigen, Diagnostic   Result Value Ref Range    PSA DIAGNOSTIC 0.85 0.00 - 4.00 ng/mL     Lab Results   Component Value Date    CREATININE 1.4 10/25/2018    CREATININE 1.4 04/24/2018    CREATININE 1.5 (H) 02/02/2018     Results for orders placed or performed in visit on 02/02/18   Testosterone   Result Value Ref Range    Testosterone, Total 282 195.0 - 1138.0 ng/dL     Urine Culture, Routine   Date Value Ref Range Status   12/02/2011   Final    MULTIPLE ORGANISMS ISOLATED. NONE IN PREDOMINANCE. REPEAT IF CLINICALLY NECESSARY.     Radiology:  Bone scan 4/2/18  Patient was administered 27.3 millicuries of technetium 99 M labeled MDP intravenously.  There is degenerative activity of the shoulders, wrists, hands, knees, ankles, and feet.  There are bilateral genu varus deformities from severe DJD.  There is poorly functioning left kidney.  Right kidney is normal.  There is no evidence of metastatic disease.  There is mild DJD of the C-spine.    MAG 3 renal scan 4/121/18  1.  Normal function and response to lasix of  the right kidney.     2. Persistent, severely decreased function of the left kidney with poor  response to lasix.  Not significantly changed compared to prior scan.     3. Differential function left kidney 12%, right kidney 88%. Not changed   Significantly when compared to last time.    CT RSS 6/1/18  Multiple nonobstructing right renal stones measuring up to 0.5 cm.  Nonobstructing left renal stone measuring 0.5 cm.    Partial left nephrectomy and left renal atrophy.  Stable left renal cysts.    Atelectasis versus parenchymal scarring of the lingula.    Assessment and Plan:  Diagnoses and all orders for this visit:    Cancer of prostate    Elevated PSA    chemical failure with rising PSA following definite XRT combined  with ADT for 2 years for high grade prostate cancer.  No evidence of metastatic disease noted.  Recent prostate bx was negative for recurrence.  But his PSA doubling time is getting less than 1 year.  Thus we decided to start ADT.  Lupron injection ordered today.    For his kidney stone, I will follow up with serial CT RSS, since I was not able to see any stone on KUB due to bowel gas and stools.  Continue Urocit K once a day for his kidney stone disease but can't be aggressive in treatment due to his decreased renal function.    For his ED, he can try generic viagra at [facility] pharmacy.    I spent 40 minutes with the patient of which more than half was spent in direct consultation with the patient in regards to our treatment and plan.      Follow-up:  No Follow-up on file.

## 2018-12-20 DIAGNOSIS — R60.9 EDEMA, UNSPECIFIED TYPE: ICD-10-CM

## 2018-12-20 DIAGNOSIS — E78.5 HYPERLIPIDEMIA LDL GOAL <130: ICD-10-CM

## 2018-12-20 RX ORDER — SPIRONOLACTONE 25 MG/1
TABLET ORAL
Qty: 90 TABLET | Refills: 0 | Status: SHIPPED | OUTPATIENT
Start: 2018-12-20 | End: 2019-03-07 | Stop reason: SDUPTHER

## 2018-12-20 RX ORDER — ATORVASTATIN CALCIUM 10 MG/1
TABLET, FILM COATED ORAL
Qty: 90 TABLET | Refills: 0 | Status: SHIPPED | OUTPATIENT
Start: 2018-12-20 | End: 2019-03-07 | Stop reason: SDUPTHER

## 2019-01-24 DIAGNOSIS — I11.9 HYPERTENSIVE HEART DISEASE WITHOUT HEART FAILURE: ICD-10-CM

## 2019-01-24 RX ORDER — LOSARTAN POTASSIUM 100 MG/1
TABLET ORAL
Qty: 90 TABLET | Refills: 1 | Status: SHIPPED | OUTPATIENT
Start: 2019-01-24 | End: 2019-08-01 | Stop reason: SDUPTHER

## 2019-03-07 DIAGNOSIS — I11.9 HYPERTENSIVE HEART DISEASE WITHOUT HEART FAILURE: ICD-10-CM

## 2019-03-07 DIAGNOSIS — R60.9 EDEMA, UNSPECIFIED TYPE: ICD-10-CM

## 2019-03-07 DIAGNOSIS — E78.5 HYPERLIPIDEMIA LDL GOAL <130: ICD-10-CM

## 2019-03-08 RX ORDER — SPIRONOLACTONE 25 MG/1
TABLET ORAL
Qty: 90 TABLET | Refills: 2 | Status: SHIPPED | OUTPATIENT
Start: 2019-03-08 | End: 2019-08-30 | Stop reason: SDUPTHER

## 2019-03-08 RX ORDER — HYDROCHLOROTHIAZIDE 25 MG/1
TABLET ORAL
Qty: 90 TABLET | Refills: 2 | Status: SHIPPED | OUTPATIENT
Start: 2019-03-08 | End: 2019-08-30 | Stop reason: SDUPTHER

## 2019-03-08 RX ORDER — ATORVASTATIN CALCIUM 10 MG/1
TABLET, FILM COATED ORAL
Qty: 90 TABLET | Refills: 2 | Status: SHIPPED | OUTPATIENT
Start: 2019-03-08 | End: 2019-08-30 | Stop reason: DRUGHIGH

## 2019-03-26 ENCOUNTER — PES CALL (OUTPATIENT)
Dept: ADMINISTRATIVE | Facility: CLINIC | Age: 69
End: 2019-03-26

## 2019-04-26 ENCOUNTER — HOSPITAL ENCOUNTER (OUTPATIENT)
Dept: RADIOLOGY | Facility: HOSPITAL | Age: 69
Discharge: HOME | End: 2019-04-26
Attending: UROLOGY
Payer: MEDICARE

## 2019-04-26 DIAGNOSIS — N20.0 NEPHROLITHIASIS: ICD-10-CM

## 2019-04-26 DIAGNOSIS — Z85.528 HISTORY OF KIDNEY CANCER: ICD-10-CM

## 2019-04-26 DIAGNOSIS — Z90.5 HISTORY OF PARTIAL NEPHRECTOMY: ICD-10-CM

## 2019-04-26 PROCEDURE — 74176 CT ABD & PELVIS W/O CONTRAST: CPT | Mod: 26,,, | Performed by: RADIOLOGY

## 2019-04-26 PROCEDURE — 74176 CT ABD & PELVIS W/O CONTRAST: CPT | Mod: TC

## 2019-04-26 PROCEDURE — 74176 CT RENAL STONE STUDY ABD PELVIS WO: ICD-10-PCS | Mod: 26,,, | Performed by: RADIOLOGY

## 2019-04-29 ENCOUNTER — OFFICE VISIT (OUTPATIENT)
Dept: UROLOGY | Facility: CLINIC | Age: 69
End: 2019-04-29
Payer: MEDICARE

## 2019-04-29 VITALS
DIASTOLIC BLOOD PRESSURE: 73 MMHG | HEIGHT: 69 IN | WEIGHT: 315 LBS | BODY MASS INDEX: 46.65 KG/M2 | HEART RATE: 78 BPM | SYSTOLIC BLOOD PRESSURE: 115 MMHG

## 2019-04-29 DIAGNOSIS — C61 CANCER OF PROSTATE: ICD-10-CM

## 2019-04-29 DIAGNOSIS — Z85.528 HISTORY OF KIDNEY CANCER: ICD-10-CM

## 2019-04-29 DIAGNOSIS — N20.0 NEPHROLITHIASIS: Primary | ICD-10-CM

## 2019-04-29 PROCEDURE — 1101F PT FALLS ASSESS-DOCD LE1/YR: CPT | Mod: CPTII,S$GLB,, | Performed by: UROLOGY

## 2019-04-29 PROCEDURE — 99999 PR PBB SHADOW E&M-EST. PATIENT-LVL III: ICD-10-PCS | Mod: PBBFAC,,, | Performed by: UROLOGY

## 2019-04-29 PROCEDURE — 3078F PR MOST RECENT DIASTOLIC BLOOD PRESSURE < 80 MM HG: ICD-10-PCS | Mod: CPTII,S$GLB,, | Performed by: UROLOGY

## 2019-04-29 PROCEDURE — 3074F PR MOST RECENT SYSTOLIC BLOOD PRESSURE < 130 MM HG: ICD-10-PCS | Mod: CPTII,S$GLB,, | Performed by: UROLOGY

## 2019-04-29 PROCEDURE — 99999 PR PBB SHADOW E&M-EST. PATIENT-LVL III: CPT | Mod: PBBFAC,,, | Performed by: UROLOGY

## 2019-04-29 PROCEDURE — 99499 UNLISTED E&M SERVICE: CPT | Mod: S$GLB,,, | Performed by: UROLOGY

## 2019-04-29 PROCEDURE — 1101F PR PT FALLS ASSESS DOC 0-1 FALLS W/OUT INJ PAST YR: ICD-10-PCS | Mod: CPTII,S$GLB,, | Performed by: UROLOGY

## 2019-04-29 PROCEDURE — 3078F DIAST BP <80 MM HG: CPT | Mod: CPTII,S$GLB,, | Performed by: UROLOGY

## 2019-04-29 PROCEDURE — 99499 RISK ADDL DX/OHS AUDIT: ICD-10-PCS | Mod: S$GLB,,, | Performed by: UROLOGY

## 2019-04-29 PROCEDURE — 99215 PR OFFICE/OUTPT VISIT, EST, LEVL V, 40-54 MIN: ICD-10-PCS | Mod: S$GLB,,, | Performed by: UROLOGY

## 2019-04-29 PROCEDURE — 3074F SYST BP LT 130 MM HG: CPT | Mod: CPTII,S$GLB,, | Performed by: UROLOGY

## 2019-04-29 PROCEDURE — 99215 OFFICE O/P EST HI 40 MIN: CPT | Mod: S$GLB,,, | Performed by: UROLOGY

## 2019-04-29 RX ORDER — TAMSULOSIN HYDROCHLORIDE 0.4 MG/1
1 CAPSULE ORAL DAILY
Qty: 90 CAPSULE | Refills: 3 | Status: SHIPPED | OUTPATIENT
Start: 2019-04-29 | End: 2020-06-04 | Stop reason: SDUPTHER

## 2019-04-29 RX ORDER — POTASSIUM CITRATE 10 MEQ/1
10 TABLET, EXTENDED RELEASE ORAL 2 TIMES DAILY WITH MEALS
Qty: 180 TABLET | Refills: 3 | Status: SHIPPED | OUTPATIENT
Start: 2019-04-29 | End: 2020-01-13 | Stop reason: SDUPTHER

## 2019-04-29 NOTE — PROGRESS NOTES
CC: prostate cancer, rising PSA, Right kidney stones    Gregg Mcbride is a 68 y.o. man who is here for the evaluation of Nephrolithiasis (had CT scan and labs)    I did his TRUS bx of prostate on 6/12/18 for rising PSA since XRT combined with ADT back in 2010.  FINAL PATHOLOGIC DIAGNOSIS  PROSTATE BIOPSIES 1, 2, 3, 4, 5, AND 6:  NO DISTINCT RESIDUAL CARCINOMA IDENTIFIED  PICTURE CONSISTENT WITH PRIOR THERAPY    His pre-treatment PSA was 31.  TRUS bx of prostate showed [place] 8 and 9 (4+5) on 1/22/2010.  The patient received an Eligard injection on 2/4/10.  He completed radiotherapy to the prostate and pelvic nodes on 7/1/10. His last Eligard injection was given in August of 2012.    Following his combo therapy, his PSA remained undetectable until 6/2015.  Then starting 2/2016, his PSA is detectable with PSA greater than 0.2.  However, his PSA double time has been about 12 months.  His PSA has risen to 1.3 on 10/25/18 from 0.85 on 2/2/18.    In addition,he has a hx of kidney stone disease with hx of left ureteral stricture.  MAG 3 renal scan doneon 11/29/2011 showed 95% function on the right kidney and 5 % kidney function with abnormal respond to lasix.    Hx of RCC, s/p partial left nephrectomy on 5/10/11 for RCC clear cell type (pT1a No Mo).    He is on flomax for his LUTS and reports no problem with urination.    Denies flank pain, dysuira, hematuria.    Nocturia 2 to 3 x.    He is here with CT RSS and blood tests.    Past Medical History:   Diagnosis Date    Arthritis     Cancer 5/2011    Kidney    Chronic kidney disease     stones    History of colon polyps     Hypertension     Morbid obesity with BMI of 50.0-59.9, adult     Prostate cancer      Past Surgical History:   Procedure Laterality Date    COLONOSCOPY N/A 5/19/2016    Performed by [facility]z [facility] at [facility] ([facility])    HERNIA REPAIR      KIDNEY SURGERY  2011     Social History     Tobacco Use    Smoking status: Never Smoker     Smokeless tobacco: Never Used   Substance Use Topics    Alcohol use: Yes     Alcohol/week: 0.5 oz     Types: 1 Standard drinks or equivalent per week     Comment: socially    Drug use: No     Family History   Problem Relation Age of Onset    Diabetes Mother     Hypertension Mother     Coronary artery disease Father     Hypertension Sister     Hypertension Brother      Allergy:  Review of patient's allergies indicates:   Allergen Reactions    Sulfa (sulfonamide antibiotics) Rash     Outpatient Encounter Medications as of 4/29/2019   Medication Sig Dispense Refill    atorvastatin (LIPITOR) 10 MG tablet TAKE ONE TABLET BY MOUTH EVERY DAY. 90 tablet 2    carvedilol (COREG) 12.5 MG tablet TAKE  (1)  TABLET TWICE A DAY FOR HIGH BLOOD PRESSURE. 180 tablet 1    diphth,pertus,acell,,tetanus (BOOSTRIX TDAP) 2.5-8-5 Lf-mcg-Lf/0.5mL Syrg injection Inject into the muscle. 0.5 mL 0    hydroCHLOROthiazide (HYDRODIURIL) 25 MG tablet TAKE ONE TABLET BY MOUTH EVERY DAY. 90 tablet 2    losartan (COZAAR) 100 MG tablet TAKE ONE TABLET BY MOUTH EVERY DAY. 90 tablet 1    potassium citrate (UROCIT-K 10) 10 mEq (1,080 mg) TbSR Take 1 tablet (10 mEq total) by mouth 2 (two) times daily with meals. 180 tablet 3    spironolactone (ALDACTONE) 25 MG tablet TAKE ONE TABLET BY MOUTH EVERY DAY 90 tablet 2    tamsulosin (FLOMAX) 0.4 mg Cap Take 1 capsule (0.4 mg total) by mouth once daily. 90 capsule 3    [DISCONTINUED] potassium citrate (UROCIT-K 10) 10 mEq (1,080 mg) TbSR Take 1 tablet (10 mEq total) by mouth every evening. 30 tablet 11    [DISCONTINUED] tamsulosin (FLOMAX) 0.4 mg Cap Take 1 capsule (0.4 mg total) by mouth once daily. 90 capsule 3    sildenafil (VIAGRA) 100 MG tablet Take 1 tablet (100 mg total) by mouth daily as needed for Erectile Dysfunction. 10 tablet 12     Facility-Administered Encounter Medications as of 4/29/2019   Medication Dose Route Frequency Provider Last Rate Last Dose    leuprolide (6 month) Keny  45 mg  45 mg Intramuscular Q6 Months Lance Padron MD   45 mg at 11/09/18 1018    leuprolide (6 month) SyKt 45 mg  45 mg Intramuscular Q6 Months Lance Padron MD        leuprolide (6 month) SyKt 45 mg  45 mg Intramuscular Q6 Months Lance Padron MD        lidocaine HCL 2% jelly   Topical (Top) Once Lance Padron MD         Review of Systems   General ROS: GENERAL:  No weight gain or loss  SKIN:  No rashes or lacerations  HEAD:  No headaches  EYES:  No exophthalmos, jaundice or blindness  EARS:  No dizziness, tinnitus or hearing loss  NOSE:  No changes in smell  MOUTH & THROAT:  No dyskinetic movements or obvious goiter  CHEST:  No shortness of breath, hyperventilation or cough  CARDIOVASCULAR:  No tachycardia or chest pain  ABDOMEN:  No nausea, vomiting, pain, constipation or diarrhea  URINARY:  No frequency, dysuria or sexual dysfunction  ENDOCRINE:  No polydipsia, polyuria  MUSCULOSKELETAL:  No pain or stiffness of the joints  NEUROLOGIC:  No weakness, sensory changes, seizures, confusion, memory loss, tremor or other abnormal movements  Physical Exam     Vitals:    04/29/19 1353   BP: 115/73   Pulse: 78     Physical Exam  Genitalia:  Scrotum: no rash or lesion  Normal symmetric epididymis without masses  Normal vas palpated  Normal size, symmetric testicles with no masses   Normal urethral meatus with no discharge  Normal circumcised penis with no lesion   Rectal:  Normal perineum and anus upon inspection.  Normal tone, no masses or tenderness;     LABS:  Lab Results   Component Value Date    PSA 0.05 10/02/2013    PSA 0.06 01/28/2013    PSA 0.07 08/15/2012    PSA 0.06 02/17/2012    PSA 0.11 08/16/2011    PSA 0.20 01/14/2011    PSA 0.28 08/25/2010    PSA 0.79 04/14/2010    PSA 31 (H) 09/12/2009    PSA 29 (H) 02/09/2009    PSADIAG 1.2 04/26/2019    PSADIAG 1.3 10/25/2018    PSADIAG 0.85 06/01/2018    PSADIAG 0.85 02/02/2018    PSADIAG 0.61 08/09/2017    PSADIAG 0.28 06/24/2016    PSADIAG 0.20 02/23/2016     PSADIAG 0.07 06/05/2015    PSADIAG 0.04 05/15/2014     Results for orders placed or performed in visit on 04/26/19   Prostate Specific Antigen, Diagnostic   Result Value Ref Range    PSA DIAGNOSTIC 1.2 0.00 - 4.00 ng/mL   Results for orders placed or performed in visit on 10/25/18   Prostate Specific Antigen, Diagnostic   Result Value Ref Range    PSA DIAGNOSTIC 1.3 0.00 - 4.00 ng/mL   Results for orders placed or performed in visit on 06/01/18   Prostate Specific Antigen, Diagnostic   Result Value Ref Range    PSA DIAGNOSTIC 0.85 0.00 - 4.00 ng/mL     Lab Results   Component Value Date    CREATININE 1.2 04/26/2019    CREATININE 1.4 10/25/2018    CREATININE 1.4 04/24/2018     Results for orders placed or performed in visit on 02/02/18   Testosterone   Result Value Ref Range    Testosterone, Total 282 195.0 - 1138.0 ng/dL     Urine Culture, Routine   Date Value Ref Range Status   12/02/2011   Final    MULTIPLE ORGANISMS ISOLATED. NONE IN PREDOMINANCE. REPEAT IF CLINICALLY NECESSARY.     Radiology:  Bone scan 4/2/18  Patient was administered 27.3 millicuries of technetium 99 M labeled MDP intravenously.  There is degenerative activity of the shoulders, wrists, hands, knees, ankles, and feet.  There are bilateral genu varus deformities from severe DJD.  There is poorly functioning left kidney.  Right kidney is normal.  There is no evidence of metastatic disease.  There is mild DJD of the C-spine.    MAG 3 renal scan 4/121/18  1.  Normal function and response to lasix of  the right kidney.  2. Persistent, severely decreased function of the left kidney with poor  response to lasix.  Not significantly changed compared to prior scan.  3. Differential function left kidney 12%, right kidney 88%. Not changed   Significantly when compared to last time.    CT RSS 4/26/19  1. Bilateral nonobstructing renal stones, 1 on the left, measuring 0.4 cm, and at least 4 on the right, the largest measuring 0.5 cm.  2.  Partial left  nephrectomy with a residual atrophic left kidney.  Two stable left renal cysts.    Assessment and Plan:  Gregg was seen today for nephrolithiasis.    Diagnoses and all orders for this visit:    Nephrolithiasis  -     potassium citrate (UROCIT-K 10) 10 mEq (1,080 mg) TbSR; Take 1 tablet (10 mEq total) by mouth 2 (two) times daily with meals.    Cancer of prostate  -     leuprolide (6 month) SyKt 45 mg  -     Prior Authorization Order  -     tamsulosin (FLOMAX) 0.4 mg Cap; Take 1 capsule (0.4 mg total) by mouth once daily.  -     Prostate Specific Antigen, Diagnostic; Future  -     Testosterone; Future  -     CBC Without Differential; Future  -     Comprehensive metabolic panel; Future  -     leuprolide (6 month) SyKt 45 mg  -     Prior Authorization Order    History of kidney cancer    chemical failure with rising PSA following definite XRT combined with ADT for 2 years for high grade prostate cancer.  No evidence of metastatic disease noted.  Recent prostate bx was negative for recurrence.  But his PSA doubling time is getting less than 1 year.  Thus we decided to start ADT.  His is stable since we started Luorn injeciotn.  Will continue Lupron ADT.  Lupron injection ordered today.    For his kidney stone, stable stone burden on CT RSS.  I reviewed his CT today with him.  Continue Urocit K BID for his kidney stone disease but can't be aggressive in treatment due to his decreased renal function.    For his ED, he can try generic viagra at [facility] pharmacy.    I spent 40 minutes with the patient of which more than half was spent in direct consultation with the patient in regards to our treatment and plan.      Follow-up:  Follow up for Lupron injection, BMP, PSA, testosterone.

## 2019-04-30 ENCOUNTER — TELEPHONE (OUTPATIENT)
Dept: UROLOGY | Facility: CLINIC | Age: 69
End: 2019-04-30

## 2019-04-30 DIAGNOSIS — N34.2 URETHRITIS: Primary | ICD-10-CM

## 2019-04-30 RX ORDER — DOXYCYCLINE 100 MG/1
100 CAPSULE ORAL 2 TIMES DAILY
Qty: 14 CAPSULE | Refills: 0 | Status: SHIPPED | OUTPATIENT
Start: 2019-04-30 | End: 2019-05-07

## 2019-04-30 NOTE — TELEPHONE ENCOUNTER
----- Message from Umm Gilliland LPN sent at 4/30/2019  1:20 PM CDT -----  Contact: pt#[phone]  Pt states he discussed with you yesterday having urethral irritation with urination. He thinks he recently passed a stone. He would like an antibiotic for it   ----- Message -----  From: Su Quigley  Sent: 4/30/2019  12:46 PM  To: Vito RITCHIE Staff    Needs Advice    Reason for call:Pt states that wants to speak with you but he did not want to give detail         Communication Preference:call  Additional Information:

## 2019-04-30 NOTE — TELEPHONE ENCOUNTER
Urethritis  -     doxycycline (VIBRAMYCIN) 100 MG Cap; Take 1 capsule (100 mg total) by mouth 2 (two) times daily. for 14 doses  Dispense: 14 capsule; Refill: 0

## 2019-05-13 ENCOUNTER — CLINICAL SUPPORT (OUTPATIENT)
Dept: UROLOGY | Facility: CLINIC | Age: 69
End: 2019-05-13
Payer: MEDICARE

## 2019-05-13 DIAGNOSIS — C61 CANCER OF PROSTATE: Primary | ICD-10-CM

## 2019-05-13 PROCEDURE — 96402 PR CHEMOTHER HORMON ANTINEOPL SUB-Q/IM: ICD-10-PCS | Mod: S$GLB,,, | Performed by: UROLOGY

## 2019-05-13 PROCEDURE — 96402 CHEMO HORMON ANTINEOPL SQ/IM: CPT | Mod: S$GLB,,, | Performed by: UROLOGY

## 2019-05-13 PROCEDURE — 99499 RISK ADDL DX/OHS AUDIT: ICD-10-PCS | Mod: S$GLB,,, | Performed by: UROLOGY

## 2019-05-13 PROCEDURE — 99213 PR OFFICE/OUTPT VISIT, EST, LEVL III, 20-29 MIN: ICD-10-PCS | Mod: 25,S$GLB,, | Performed by: UROLOGY

## 2019-05-13 PROCEDURE — 99499 UNLISTED E&M SERVICE: CPT | Mod: S$GLB,,, | Performed by: UROLOGY

## 2019-05-13 PROCEDURE — 99213 OFFICE O/P EST LOW 20 MIN: CPT | Mod: 25,S$GLB,, | Performed by: UROLOGY

## 2019-05-13 NOTE — PROGRESS NOTES
CC: Lupron injection for prostate cancer, rising PSA, urethral irritation, hx of right kidney stone    Gregg Mcbride is a 68 y.o. man who is here for the lupron injection.  Did better after doxycycline is given for his urethral irritation.  Voices no complaints.    I did his TRUS bx of prostate on 6/12/18 for rising PSA since XRT combined with ADT back in 2010.  FINAL PATHOLOGIC DIAGNOSIS  PROSTATE BIOPSIES 1, 2, 3, 4, 5, AND 6:  NO DISTINCT RESIDUAL CARCINOMA IDENTIFIED  PICTURE CONSISTENT WITH PRIOR THERAPY    His pre-treatment PSA was 31.  TRUS bx of prostate showed Nola 8 and 9 (4+5) on 1/22/2010.  The patient received an Eligard injection on 2/4/10.  He completed radiotherapy to the prostate and pelvic nodes on 7/1/10. His last Eligard injection was given in August of 2012.    Following his combo therapy, his PSA remained undetectable until 6/2015.  Then starting 2/2016, his PSA is detectable with PSA greater than 0.2.  However, his PSA double time has been about 12 months.  His PSA has risen to 1.3 on 10/25/18 from 0.85 on 2/2/18.    In addition,he has a hx of kidney stone disease with hx of left ureteral stricture.  MAG 3 renal scan doneon 11/29/2011 showed 95% function on the right kidney and 5 % kidney function with abnormal respond to lasix.    Hx of RCC, s/p partial left nephrectomy on 5/10/11 for RCC clear cell type (pT1a No Mo).    He is on flomax for his LUTS and reports no problem with urination.    Denies flank pain, dysuira, hematuria.    Nocturia 2 to 3 x.    He is here with CT RSS and blood tests.    Past Medical History:   Diagnosis Date    Arthritis     Cancer 5/2011    Kidney    Chronic kidney disease     stones    History of colon polyps     Hypertension     Morbid obesity with BMI of 50.0-59.9, adult     Prostate cancer      Past Surgical History:   Procedure Laterality Date    COLONOSCOPY N/A 5/19/2016    Performed by Itz Simeon MD at [facility] (4TH FLR)    HERNIA REPAIR       KIDNEY SURGERY  2011     Social History     Tobacco Use    Smoking status: Never Smoker    Smokeless tobacco: Never Used   Substance Use Topics    Alcohol use: Yes     Alcohol/week: 0.5 oz     Types: 1 Standard drinks or equivalent per week     Comment: socially    Drug use: No     Family History   Problem Relation Age of Onset    Diabetes Mother     Hypertension Mother     Coronary artery disease Father     Hypertension Sister     Hypertension Brother      Allergy:  Review of patient's allergies indicates:   Allergen Reactions    Sulfa (sulfonamide antibiotics) Rash     Outpatient Encounter Medications as of 5/13/2019   Medication Sig Dispense Refill    atorvastatin (LIPITOR) 10 MG tablet TAKE ONE TABLET BY MOUTH EVERY DAY. 90 tablet 2    carvedilol (COREG) 12.5 MG tablet TAKE  (1)  TABLET TWICE A DAY FOR HIGH BLOOD PRESSURE. 180 tablet 1    diphth,pertus,acell,,tetanus (BOOSTRIX TDAP) 2.5-8-5 Lf-mcg-Lf/0.5mL Syrg injection Inject into the muscle. 0.5 mL 0    hydroCHLOROthiazide (HYDRODIURIL) 25 MG tablet TAKE ONE TABLET BY MOUTH EVERY DAY. 90 tablet 2    losartan (COZAAR) 100 MG tablet TAKE ONE TABLET BY MOUTH EVERY DAY. 90 tablet 1    potassium citrate (UROCIT-K 10) 10 mEq (1,080 mg) TbSR Take 1 tablet (10 mEq total) by mouth 2 (two) times daily with meals. 180 tablet 3    sildenafil (VIAGRA) 100 MG tablet Take 1 tablet (100 mg total) by mouth daily as needed for Erectile Dysfunction. 10 tablet 12    spironolactone (ALDACTONE) 25 MG tablet TAKE ONE TABLET BY MOUTH EVERY DAY 90 tablet 2    tamsulosin (FLOMAX) 0.4 mg Cap Take 1 capsule (0.4 mg total) by mouth once daily. 90 capsule 3     Facility-Administered Encounter Medications as of 5/13/2019   Medication Dose Route Frequency Provider Last Rate Last Dose    leuprolide (6 month) SyKt 45 mg  45 mg Intramuscular Q6 Months Lance Padron MD   45 mg at 11/09/18 1018    leuprolide (6 month) SyKt 45 mg  45 mg Intramuscular Q6 Months Lance GOODWIN  MD Vito        leuprolide (6 month) SyKt 45 mg  45 mg Intramuscular Q6 Months Lance Padron MD   45 mg at 05/13/19 1308    [START ON 11/13/2019] leuprolide (6 month) SyKt 45 mg  45 mg Intramuscular Q6 Months Lance Padron MD        lidocaine HCL 2% jelly   Topical (Top) Once Lance Padron MD         Review of Systems   General ROS: GENERAL:  No weight gain or loss  SKIN:  No rashes or lacerations  HEAD:  No headaches  EYES:  No exophthalmos, jaundice or blindness  EARS:  No dizziness, tinnitus or hearing loss  NOSE:  No changes in smell  MOUTH & THROAT:  No dyskinetic movements or obvious goiter  CHEST:  No shortness of breath, hyperventilation or cough  CARDIOVASCULAR:  No tachycardia or chest pain  ABDOMEN:  No nausea, vomiting, pain, constipation or diarrhea  URINARY:  No frequency, dysuria or sexual dysfunction  ENDOCRINE:  No polydipsia, polyuria  MUSCULOSKELETAL:  No pain or stiffness of the joints  NEUROLOGIC:  No weakness, sensory changes, seizures, confusion, memory loss, tremor or other abnormal movements  Physical Exam     There were no vitals filed for this visit.  Physical Exam  Genitalia:  Scrotum: no rash or lesion  Normal symmetric epididymis without masses  Normal vas palpated  Normal size, symmetric testicles with no masses   Normal urethral meatus with no discharge  Normal circumcised penis with no lesion   Rectal:  Normal perineum and anus upon inspection.  Normal tone, no masses or tenderness;     LABS:  Lab Results   Component Value Date    PSA 0.05 10/02/2013    PSA 0.06 01/28/2013    PSA 0.07 08/15/2012    PSA 0.06 02/17/2012    PSA 0.11 08/16/2011    PSA 0.20 01/14/2011    PSA 0.28 08/25/2010    PSA 0.79 04/14/2010    PSA 31 (H) 09/12/2009    PSA 29 (H) 02/09/2009    PSADIAG 1.2 04/26/2019    PSADIAG 1.3 10/25/2018    PSADIAG 0.85 06/01/2018    PSADIAG 0.85 02/02/2018    PSADIAG 0.61 08/09/2017    PSADIAG 0.28 06/24/2016    PSADIAG 0.20 02/23/2016    PSADIAG 0.07 06/05/2015    PSADIAG  0.04 05/15/2014     Results for orders placed or performed in visit on 04/26/19   Prostate Specific Antigen, Diagnostic   Result Value Ref Range    PSA DIAGNOSTIC 1.2 0.00 - 4.00 ng/mL   Results for orders placed or performed in visit on 10/25/18   Prostate Specific Antigen, Diagnostic   Result Value Ref Range    PSA DIAGNOSTIC 1.3 0.00 - 4.00 ng/mL   Results for orders placed or performed in visit on 06/01/18   Prostate Specific Antigen, Diagnostic   Result Value Ref Range    PSA DIAGNOSTIC 0.85 0.00 - 4.00 ng/mL     Lab Results   Component Value Date    CREATININE 1.2 04/26/2019    CREATININE 1.4 10/25/2018    CREATININE 1.4 04/24/2018     Results for orders placed or performed in visit on 02/02/18   Testosterone   Result Value Ref Range    Testosterone, Total 282 195.0 - 1138.0 ng/dL     Urine Culture, Routine   Date Value Ref Range Status   12/02/2011   Final    MULTIPLE ORGANISMS ISOLATED. NONE IN PREDOMINANCE. REPEAT IF CLINICALLY NECESSARY.     Radiology:  Bone scan 4/2/18  Patient was administered 27.3 millicuries of technetium 99 M labeled MDP intravenously.  There is degenerative activity of the shoulders, wrists, hands, knees, ankles, and feet.  There are bilateral genu varus deformities from severe DJD.  There is poorly functioning left kidney.  Right kidney is normal.  There is no evidence of metastatic disease.  There is mild DJD of the C-spine.    MAG 3 renal scan 4/121/18  1.  Normal function and response to lasix of  the right kidney.  2. Persistent, severely decreased function of the left kidney with poor  response to lasix.  Not significantly changed compared to prior scan.  3. Differential function left kidney 12%, right kidney 88%. Not changed   Significantly when compared to last time.    CT RSS 4/26/19  1. Bilateral nonobstructing renal stones, 1 on the left, measuring 0.4 cm, and at least 4 on the right, the largest measuring 0.5 cm.  2.  Partial left nephrectomy with a residual atrophic left  kidney.  Two stable left renal cysts.    Assessment and Plan:  Diagnoses and all orders for this visit:    Cancer of prostate  -     Prostate Specific Antigen, Diagnostic; Future  -     leuprolide (6 month) SyKt 45 mg  -     Prior Authorization Order    chemical failure with rising PSA following definite XRT combined with ADT for 2 years for high grade prostate cancer.  No evidence of metastatic disease noted.  Recent prostate bx was negative for recurrence.  Will continue Lupron ADT.  Lupron injection given today.    For his kidney stone, stable stone burden on CT RSS.  I reviewed his CT today with him.  Continue Urocit K BID for his kidney stone disease but can't be aggressive in treatment due to his decreased renal function.    For his ED, he can try generic viagra at [facility].    I spent 15 minutes with the patient of which more than half was spent in direct consultation with the patient in regards to our treatment and plan.      Follow-up:  Follow up in about 6 months (around 11/13/2019) for PSA, Lupron injection.

## 2019-06-07 DIAGNOSIS — I11.9 HYPERTENSIVE HEART DISEASE WITHOUT HEART FAILURE: ICD-10-CM

## 2019-06-07 RX ORDER — CARVEDILOL 12.5 MG/1
TABLET ORAL
Qty: 180 TABLET | Refills: 0 | Status: SHIPPED | OUTPATIENT
Start: 2019-06-07 | End: 2019-08-30 | Stop reason: SDUPTHER

## 2019-07-23 ENCOUNTER — OFFICE VISIT (OUTPATIENT)
Dept: INTERNAL MEDICINE | Facility: CLINIC | Age: 69
End: 2019-07-23
Payer: MEDICARE

## 2019-07-23 VITALS
TEMPERATURE: 97 F | OXYGEN SATURATION: 95 % | DIASTOLIC BLOOD PRESSURE: 90 MMHG | HEART RATE: 77 BPM | SYSTOLIC BLOOD PRESSURE: 142 MMHG | WEIGHT: 315 LBS | BODY MASS INDEX: 46.65 KG/M2 | HEIGHT: 69 IN

## 2019-07-23 DIAGNOSIS — M62.830 SPASM OF MUSCLE OF LOWER BACK: Primary | ICD-10-CM

## 2019-07-23 PROCEDURE — 1101F PR PT FALLS ASSESS DOC 0-1 FALLS W/OUT INJ PAST YR: ICD-10-PCS | Mod: CPTII,S$GLB,, | Performed by: PHYSICIAN ASSISTANT

## 2019-07-23 PROCEDURE — 99999 PR PBB SHADOW E&M-EST. PATIENT-LVL IV: ICD-10-PCS | Mod: PBBFAC,,, | Performed by: PHYSICIAN ASSISTANT

## 2019-07-23 PROCEDURE — 3080F DIAST BP >= 90 MM HG: CPT | Mod: CPTII,S$GLB,, | Performed by: PHYSICIAN ASSISTANT

## 2019-07-23 PROCEDURE — 99214 PR OFFICE/OUTPT VISIT, EST, LEVL IV, 30-39 MIN: ICD-10-PCS | Mod: S$GLB,,, | Performed by: PHYSICIAN ASSISTANT

## 2019-07-23 PROCEDURE — 3077F PR MOST RECENT SYSTOLIC BLOOD PRESSURE >= 140 MM HG: ICD-10-PCS | Mod: CPTII,S$GLB,, | Performed by: PHYSICIAN ASSISTANT

## 2019-07-23 PROCEDURE — 1101F PT FALLS ASSESS-DOCD LE1/YR: CPT | Mod: CPTII,S$GLB,, | Performed by: PHYSICIAN ASSISTANT

## 2019-07-23 PROCEDURE — 99214 OFFICE O/P EST MOD 30 MIN: CPT | Mod: S$GLB,,, | Performed by: PHYSICIAN ASSISTANT

## 2019-07-23 PROCEDURE — 3077F SYST BP >= 140 MM HG: CPT | Mod: CPTII,S$GLB,, | Performed by: PHYSICIAN ASSISTANT

## 2019-07-23 PROCEDURE — 3080F PR MOST RECENT DIASTOLIC BLOOD PRESSURE >= 90 MM HG: ICD-10-PCS | Mod: CPTII,S$GLB,, | Performed by: PHYSICIAN ASSISTANT

## 2019-07-23 PROCEDURE — 99999 PR PBB SHADOW E&M-EST. PATIENT-LVL IV: CPT | Mod: PBBFAC,,, | Performed by: PHYSICIAN ASSISTANT

## 2019-07-23 RX ORDER — METHOCARBAMOL 750 MG/1
750 TABLET, FILM COATED ORAL 3 TIMES DAILY
Qty: 20 TABLET | Refills: 0 | Status: SHIPPED | OUTPATIENT
Start: 2019-07-23 | End: 2019-07-23 | Stop reason: SDUPTHER

## 2019-07-23 RX ORDER — METHOCARBAMOL 750 MG/1
750 TABLET, FILM COATED ORAL 3 TIMES DAILY
Qty: 20 TABLET | Refills: 0 | Status: SHIPPED | OUTPATIENT
Start: 2019-07-23 | End: 2020-12-22

## 2019-07-23 NOTE — PROGRESS NOTES
Subjective:      Patient ID: Gregg Mcbride is a 68 y.o. male.    Chief Complaint: Back Pain and Fall (x 3 days)    Fell while mowing the lawn on Saturday. Fell to his right side. Since then his low left back has been causing him pain.    Low-back Pain   This is a new problem. The current episode started in the past 7 days. The problem occurs constantly. The problem has been unchanged. Associated symptoms include arthralgias and myalgias. Pertinent negatives include no abdominal pain, anorexia, change in bowel habit, chest pain, chills, congestion, coughing, diaphoresis, fatigue, fever, headaches, joint swelling, nausea, neck pain, numbness, rash, sore throat, swollen glands, urinary symptoms, vertigo, visual change, vomiting or weakness. Nothing aggravates the symptoms. He has tried acetaminophen for the symptoms. The treatment provided moderate relief.       Review of Systems   Constitutional: Negative for activity change, appetite change, chills, diaphoresis, fatigue, fever and unexpected weight change.   HENT: Negative.  Negative for congestion, hearing loss, postnasal drip, rhinorrhea, sore throat, trouble swallowing and voice change.    Eyes: Negative.  Negative for visual disturbance.   Respiratory: Negative.  Negative for cough, choking, chest tightness and shortness of breath.    Cardiovascular: Negative for chest pain, palpitations and leg swelling.   Gastrointestinal: Negative for abdominal distention, abdominal pain, anorexia, blood in stool, change in bowel habit, constipation, diarrhea, nausea and vomiting.   Endocrine: Negative for cold intolerance, heat intolerance, polydipsia and polyuria.   Genitourinary: Negative.  Negative for difficulty urinating and frequency.   Musculoskeletal: Positive for arthralgias, back pain and myalgias. Negative for gait problem, joint swelling, neck pain and neck stiffness.   Skin: Negative for color change, pallor, rash and wound.   Neurological: Negative for  "dizziness, vertigo, tremors, weakness, light-headedness, numbness and headaches.   Hematological: Negative for adenopathy.   Psychiatric/Behavioral: Negative for behavioral problems, confusion, self-injury, sleep disturbance and suicidal ideas. The patient is not nervous/anxious.      Objective:   BP (!) 142/90 (BP Location: Right arm, Patient Position: Sitting, BP Method: Large (Manual))   Pulse 77   Temp 97 °F (36.1 °C) (Tympanic)   Ht 5' 9" (1.753 m)   Wt (!) 150.5 kg (331 lb 12.7 oz)   SpO2 95%   BMI 49.00 kg/m²     Physical Exam   Constitutional: He is oriented to person, place, and time. He appears well-developed and well-nourished.   HENT:   Head: Normocephalic and atraumatic.   Eyes: Pupils are equal, round, and reactive to light. Conjunctivae and EOM are normal.   Neck: Normal range of motion. Neck supple.   Cardiovascular: Normal rate, regular rhythm and normal heart sounds. Exam reveals no gallop and no friction rub.   No murmur heard.  Pulmonary/Chest: Effort normal and breath sounds normal. No respiratory distress. He has no wheezes. He has no rales. He exhibits no tenderness.   Musculoskeletal: Normal range of motion. He exhibits no edema or deformity.        Right hip: Normal. He exhibits normal range of motion, normal strength and no tenderness.        Left hip: Normal. He exhibits normal range of motion, normal strength and no tenderness.        Lumbar back: He exhibits tenderness and spasm. He exhibits normal range of motion, no bony tenderness, no swelling, no edema, no deformity, no laceration, no pain and normal pulse.        Back:    Lymphadenopathy:     He has no cervical adenopathy.   Neurological: He is alert and oriented to person, place, and time.   Skin: Skin is warm and dry.   Psychiatric: He has a normal mood and affect. His behavior is normal. Judgment and thought content normal.   Vitals reviewed.      Assessment:     1. Spasm of muscle of lower back      Plan:   Spasm of muscle " of lower back  -     methocarbamol (ROBAXIN) 750 MG Tab; Take 1 tablet (750 mg total) by mouth 3 (three) times daily.  Dispense: 20 tablet; Refill: 0  -Educational handout on over-the-counter medications and at-home conservative care, pertinent to the patients diagnosis today, was handed to the patient and discussed in detail.    Follow up if symptoms worsen or fail to improve.

## 2019-07-23 NOTE — PATIENT INSTRUCTIONS
Relieving Back Pain  Back pain is a common problem. You can strain back muscles by lifting too much weight or just by moving the wrong way. Back strain can be uncomfortable, even painful. And it can take weeks or months to improve. To help yourself feel better and prevent future back strains, try these tips.  Important Note: Do not give aspirin to children or teens without first discussing it with your healthcare provider.      ? Ice    Ice reduces muscle pain and swelling. It helps most during the first 24 to 48 hours after an injury.  · Wrap an ice pack or a bag of frozen peas in a thin towel. (Never place ice directly on your skin.)  · Place the ice where your back hurts the most.  · Dont ice for more than 20 minutes at a time.  · You can use ice several times a day.  ? Medicines  Over-the-counter pain relievers can include acetaminophen and anti-inflammatory medicines, which includes aspirin or ibuprofen. They can help ease discomfort. Some also reduce swelling.  · Tell your healthcare provider about any medicines you are already taking.  · Take medicines only as directed.  ? Heat  After the first 48 hours, heat can relax sore muscles and improve blood flow.  · Try a warm bath or shower. Or use a heating pad set on low. To prevent a burn, keep a cloth between you and the heating pad.  · Dont use a heating pad for more than 15 minutes at a time. Never sleep on a heating pad.  Date Last Reviewed: 9/1/2015  © 4597-9153 [employer]. [address] [place] [address]. All rights reserved. This information is not intended as a substitute for professional medical care. Always follow your healthcare professional's instructions.        Possible Causes of Low Back or Leg Pain    The symptoms in your back or leg may be due to pressure on a nerve. This pressure may be caused by a damaged disk or by abnormal bone growth. Either way, you may feel pain, burning, tingling, or numbness. If you have  pressure on a nerve that connects to the sciatic nerve, pain may shoot down your leg.    Pressure from the disk  Constant wear and tear can weaken a disk over time and cause back pain. The disk can then be damaged by a sudden movement or injury. If its soft center begins to bulge, the disk may press on a nerve. Or the outside of the disk may tear, and the soft center may squeeze through and pinch a nerve.    Pressure from bone  As a disk wears out, the vertebrae right above and below the disk begin to touch. This can put pressure on a nerve. Often, abnormal bone (called bone spurs) grows where the vertebrae rub against each other. This can cause the foramen or the spinal canal to narrow (called stenosis) and press against a nerve.  Date Last Reviewed: 10/4/2015  © 7045-5483 [employer]. [address] [place] [address]. All rights reserved. This information is not intended as a substitute for professional medical care. Always follow your healthcare professional's instructions.        Back Spasm (No Trauma)    Spasm of the back muscles can occur after a sudden forceful twisting or bending force (such as in a car accident), after a simple awkward movement, or after lifting something heavy with poor body positioning. In any case, muscle spasm adds to the pain. Sleeping in an awkward position or on a poor quality mattress can also cause this. Some people respond to emotional stress by tensing the muscles of their back.  Pain that continues may need further evaluation or other types of treatment such as physical therapy.  You don't always need X-rays for the initial evaluation of back pain, unless you had a physical injury such as from a car accident or fall. If your pain continues and doesn't respond to medical treatment, X-rays and other tests may then be done.   Home care  · As soon as possible, start sitting or walking again to avoid problems from prolonged bed rest (muscle weakness, worsening  back stiffness and pain, blood clots in the legs).  · When in bed, try to find a position of comfort. A firm mattress is best. Try lying flat on your back with pillows under your knees. You can also try lying on your side with your knees bent up toward your chest and a pillow between your knees.  · Avoid prolonged sitting, long car rides, or travel. This puts more stress on the lower back than standing or walking.   · During the first 24 to 72 hours after an injury or flare-up, apply an ice pack to the painful area for 20 minutes, then remove it for 20 minutes. Do this over a period of 60 to 90 minutes or several times a day. This will reduce swelling and pain. Always wrap ice packs in a thin towel.  · You can start with ice, then switch to heat. Heat (hot shower, hot bath, or heating pad) reduces pain, and works well for muscle spasms. Apply heat to the painful area for 20 minutes, then remove it for 20 minutes. Do this over a period of 60 to 90 minutes or several times a day. Do not sleep on a heating pad as it can burn or damage skin.  · Alternate ice and heat therapies.  · Be aware of safe lifting methods and do not lift anything over 15 pounds until all the pain is gone.  Gentle stretching will help your back heal faster. Do this simple routine 2 to 3 times a day until your back is feeling better.  · Lie on your back with your knees bent and both feet on the ground  · Slowly raise your left knee to your chest as you flatten your lower back against the floor. Hold for 20 to 30 seconds.  · Relax and repeat the exercise with your right knee.  · Do 2 to 3 of these exercises for each leg.  · Repeat, hugging both knees to your chest at the same time.  · Do not bounce, but use a gentle pull.  Medicines  Talk to your doctor before using medicine, especially if you have other medical problems or are taking other medicines.  You may use acetaminophen or ibuprofen to control pain, unless your healthcare provider  prescribed another pain medicine. If you have a chronic condition such as diabetes, liver or kidney disease, stomach ulcer, or gastrointestinal bleeding, or are taking blood thinners, talk with your healthcare provider before taking any medicines.  Be careful if you are given prescription pain medicine, narcotics, or medicine for muscle spasm. They can cause drowsiness, affect your coordination, reflexes, or judgment. Do not drive or operate heavy machinery when taking these medicines. Take pain medicine only as prescribed by your healthcare provider.  Follow-up care  Follow up with your doctor, or as advised. Physical therapy or further tests may be needed.  If X-rays were taken, they may be reviewed by a radiologist. You will be notified of any new findings that may affect your care.  Call 911  Seek emergency medical care if any of these occur:  · Trouble breathing  · Confusion  · Drowsiness or trouble awakening  · Fainting or loss of consciousness  · Rapid or very slow heart rate  · Loss of bowel or bladder control  When to seek medical advice  Call your healthcare provider right away if any of these occur:  · Pain becomes worse or spreads to your legs  · Weakness or numbness in one or both legs  · Numbness in the groin or genital area  · Unexplained fever over 100.4ºF (38.0ºC)  · Burning or pain when passing urine  Date Last Reviewed: 6/1/2016  © 7424-2786 [employer]. [address] [place] [address], [place] [address]. All rights reserved. This information is not intended as a substitute for professional medical care. Always follow your healthcare professional's instructions.

## 2019-08-01 DIAGNOSIS — I11.9 HYPERTENSIVE HEART DISEASE WITHOUT HEART FAILURE: ICD-10-CM

## 2019-08-01 RX ORDER — LOSARTAN POTASSIUM 100 MG/1
TABLET ORAL
Qty: 90 TABLET | Refills: 0 | Status: SHIPPED | OUTPATIENT
Start: 2019-08-01 | End: 2019-08-30 | Stop reason: SDUPTHER

## 2019-08-30 ENCOUNTER — OFFICE VISIT (OUTPATIENT)
Dept: PRIMARY CARE CLINIC | Facility: CLINIC | Age: 69
End: 2019-08-30
Payer: MEDICARE

## 2019-08-30 ENCOUNTER — LAB VISIT (OUTPATIENT)
Dept: LAB | Facility: HOSPITAL | Age: 69
End: 2019-08-30
Attending: INTERNAL MEDICINE
Payer: MEDICARE

## 2019-08-30 VITALS
DIASTOLIC BLOOD PRESSURE: 78 MMHG | HEIGHT: 69 IN | SYSTOLIC BLOOD PRESSURE: 118 MMHG | WEIGHT: 315 LBS | BODY MASS INDEX: 46.65 KG/M2 | HEART RATE: 78 BPM

## 2019-08-30 DIAGNOSIS — Z23 NEED FOR SHINGLES VACCINE: ICD-10-CM

## 2019-08-30 DIAGNOSIS — I11.9 HYPERTENSIVE HEART DISEASE WITHOUT HEART FAILURE: Primary | ICD-10-CM

## 2019-08-30 DIAGNOSIS — E78.5 HYPERLIPIDEMIA, UNSPECIFIED HYPERLIPIDEMIA TYPE: ICD-10-CM

## 2019-08-30 DIAGNOSIS — R73.03 PRE-DIABETES: ICD-10-CM

## 2019-08-30 DIAGNOSIS — E66.01 MORBID OBESITY WITH BMI OF 45.0-49.9, ADULT: ICD-10-CM

## 2019-08-30 DIAGNOSIS — I70.0 ATHEROSCLEROSIS OF ABDOMINAL AORTA: ICD-10-CM

## 2019-08-30 DIAGNOSIS — N18.30 CKD (CHRONIC KIDNEY DISEASE) STAGE 3, GFR 30-59 ML/MIN: ICD-10-CM

## 2019-08-30 DIAGNOSIS — N52.9 ERECTILE DYSFUNCTION, UNSPECIFIED ERECTILE DYSFUNCTION TYPE: ICD-10-CM

## 2019-08-30 DIAGNOSIS — I87.2 CHRONIC VENOUS INSUFFICIENCY: ICD-10-CM

## 2019-08-30 DIAGNOSIS — I11.9 HYPERTENSIVE HEART DISEASE WITHOUT HEART FAILURE: ICD-10-CM

## 2019-08-30 LAB
ALBUMIN SERPL BCP-MCNC: 3.8 G/DL (ref 3.5–5.2)
ALP SERPL-CCNC: 90 U/L (ref 55–135)
ALT SERPL W/O P-5'-P-CCNC: 27 U/L (ref 10–44)
ANION GAP SERPL CALC-SCNC: 8 MMOL/L (ref 8–16)
AST SERPL-CCNC: 22 U/L (ref 10–40)
BASOPHILS # BLD AUTO: 0.04 K/UL (ref 0–0.2)
BASOPHILS NFR BLD: 1.1 % (ref 0–1.9)
BILIRUB SERPL-MCNC: 0.6 MG/DL (ref 0.1–1)
BUN SERPL-MCNC: 13 MG/DL (ref 8–23)
CALCIUM SERPL-MCNC: 9.9 MG/DL (ref 8.7–10.5)
CHLORIDE SERPL-SCNC: 103 MMOL/L (ref 95–110)
CHOLEST SERPL-MCNC: 135 MG/DL (ref 120–199)
CHOLEST/HDLC SERPL: 3.8 {RATIO} (ref 2–5)
CO2 SERPL-SCNC: 29 MMOL/L (ref 23–29)
CREAT SERPL-MCNC: 1.4 MG/DL (ref 0.5–1.4)
DIFFERENTIAL METHOD: ABNORMAL
EOSINOPHIL # BLD AUTO: 0.1 K/UL (ref 0–0.5)
EOSINOPHIL NFR BLD: 2.4 % (ref 0–8)
ERYTHROCYTE [DISTWIDTH] IN BLOOD BY AUTOMATED COUNT: 12.3 % (ref 11.5–14.5)
EST. GFR  (AFRICAN AMERICAN): 58.4 ML/MIN/1.73 M^2
EST. GFR  (NON AFRICAN AMERICAN): 50.5 ML/MIN/1.73 M^2
ESTIMATED AVG GLUCOSE: 114 MG/DL (ref 68–131)
GLUCOSE SERPL-MCNC: 128 MG/DL (ref 70–110)
HBA1C MFR BLD HPLC: 5.6 % (ref 4–5.6)
HCT VFR BLD AUTO: 40.8 % (ref 40–54)
HDLC SERPL-MCNC: 36 MG/DL (ref 40–75)
HDLC SERPL: 26.7 % (ref 20–50)
HGB BLD-MCNC: 12.9 G/DL (ref 14–18)
IMM GRANULOCYTES # BLD AUTO: 0.01 K/UL (ref 0–0.04)
IMM GRANULOCYTES NFR BLD AUTO: 0.3 % (ref 0–0.5)
LDLC SERPL CALC-MCNC: 82 MG/DL (ref 63–159)
LYMPHOCYTES # BLD AUTO: 1.2 K/UL (ref 1–4.8)
LYMPHOCYTES NFR BLD: 31.6 % (ref 18–48)
MCH RBC QN AUTO: 30.1 PG (ref 27–31)
MCHC RBC AUTO-ENTMCNC: 31.6 G/DL (ref 32–36)
MCV RBC AUTO: 95 FL (ref 82–98)
MONOCYTES # BLD AUTO: 0.5 K/UL (ref 0.3–1)
MONOCYTES NFR BLD: 13.6 % (ref 4–15)
NEUTROPHILS # BLD AUTO: 1.9 K/UL (ref 1.8–7.7)
NEUTROPHILS NFR BLD: 51 % (ref 38–73)
NONHDLC SERPL-MCNC: 99 MG/DL
NRBC BLD-RTO: 0 /100 WBC
PLATELET # BLD AUTO: 274 K/UL (ref 150–350)
PMV BLD AUTO: 10.6 FL (ref 9.2–12.9)
POTASSIUM SERPL-SCNC: 4.1 MMOL/L (ref 3.5–5.1)
PROT SERPL-MCNC: 8 G/DL (ref 6–8.4)
RBC # BLD AUTO: 4.28 M/UL (ref 4.6–6.2)
SODIUM SERPL-SCNC: 140 MMOL/L (ref 136–145)
TRIGL SERPL-MCNC: 85 MG/DL (ref 30–150)
WBC # BLD AUTO: 3.76 K/UL (ref 3.9–12.7)

## 2019-08-30 PROCEDURE — 99499 UNLISTED E&M SERVICE: CPT | Mod: S$GLB,,, | Performed by: INTERNAL MEDICINE

## 2019-08-30 PROCEDURE — 99215 PR OFFICE/OUTPT VISIT, EST, LEVL V, 40-54 MIN: ICD-10-PCS | Mod: S$GLB,,, | Performed by: INTERNAL MEDICINE

## 2019-08-30 PROCEDURE — 3078F PR MOST RECENT DIASTOLIC BLOOD PRESSURE < 80 MM HG: ICD-10-PCS | Mod: CPTII,S$GLB,, | Performed by: INTERNAL MEDICINE

## 2019-08-30 PROCEDURE — 83036 HEMOGLOBIN GLYCOSYLATED A1C: CPT

## 2019-08-30 PROCEDURE — 1101F PR PT FALLS ASSESS DOC 0-1 FALLS W/OUT INJ PAST YR: ICD-10-PCS | Mod: CPTII,S$GLB,, | Performed by: INTERNAL MEDICINE

## 2019-08-30 PROCEDURE — 99999 PR PBB SHADOW E&M-EST. PATIENT-LVL III: ICD-10-PCS | Mod: PBBFAC,,, | Performed by: INTERNAL MEDICINE

## 2019-08-30 PROCEDURE — 3078F DIAST BP <80 MM HG: CPT | Mod: CPTII,S$GLB,, | Performed by: INTERNAL MEDICINE

## 2019-08-30 PROCEDURE — 3074F SYST BP LT 130 MM HG: CPT | Mod: CPTII,S$GLB,, | Performed by: INTERNAL MEDICINE

## 2019-08-30 PROCEDURE — 80061 LIPID PANEL: CPT

## 2019-08-30 PROCEDURE — 99215 OFFICE O/P EST HI 40 MIN: CPT | Mod: S$GLB,,, | Performed by: INTERNAL MEDICINE

## 2019-08-30 PROCEDURE — 80053 COMPREHEN METABOLIC PANEL: CPT

## 2019-08-30 PROCEDURE — 1101F PT FALLS ASSESS-DOCD LE1/YR: CPT | Mod: CPTII,S$GLB,, | Performed by: INTERNAL MEDICINE

## 2019-08-30 PROCEDURE — 99999 PR PBB SHADOW E&M-EST. PATIENT-LVL III: CPT | Mod: PBBFAC,,, | Performed by: INTERNAL MEDICINE

## 2019-08-30 PROCEDURE — 36415 COLL VENOUS BLD VENIPUNCTURE: CPT | Mod: PN

## 2019-08-30 PROCEDURE — 99499 RISK ADDL DX/OHS AUDIT: ICD-10-PCS | Mod: S$GLB,,, | Performed by: INTERNAL MEDICINE

## 2019-08-30 PROCEDURE — 3074F PR MOST RECENT SYSTOLIC BLOOD PRESSURE < 130 MM HG: ICD-10-PCS | Mod: CPTII,S$GLB,, | Performed by: INTERNAL MEDICINE

## 2019-08-30 PROCEDURE — 85025 COMPLETE CBC W/AUTO DIFF WBC: CPT

## 2019-08-30 RX ORDER — ATORVASTATIN CALCIUM 20 MG/1
20 TABLET, FILM COATED ORAL DAILY
Qty: 90 TABLET | Refills: 1 | Status: SHIPPED | OUTPATIENT
Start: 2019-08-30 | End: 2020-03-16

## 2019-08-30 RX ORDER — CARVEDILOL 12.5 MG/1
TABLET ORAL
Qty: 180 TABLET | Refills: 1 | Status: SHIPPED | OUTPATIENT
Start: 2019-08-30 | End: 2020-03-16

## 2019-08-30 RX ORDER — SPIRONOLACTONE 25 MG/1
25 TABLET ORAL DAILY
Qty: 90 TABLET | Refills: 1 | Status: SHIPPED | OUTPATIENT
Start: 2019-08-30 | End: 2020-03-16

## 2019-08-30 RX ORDER — LOSARTAN POTASSIUM 100 MG/1
100 TABLET ORAL DAILY
Qty: 90 TABLET | Refills: 1 | Status: SHIPPED | OUTPATIENT
Start: 2019-08-30 | End: 2020-04-29

## 2019-08-30 RX ORDER — SILDENAFIL 100 MG/1
100 TABLET, FILM COATED ORAL DAILY PRN
Qty: 6 TABLET | Refills: 3 | Status: SHIPPED | OUTPATIENT
Start: 2019-08-30 | End: 2020-12-22 | Stop reason: SDUPTHER

## 2019-08-30 RX ORDER — HYDROCHLOROTHIAZIDE 25 MG/1
25 TABLET ORAL DAILY
Qty: 90 TABLET | Refills: 1 | Status: SHIPPED | OUTPATIENT
Start: 2019-08-30 | End: 2020-06-17

## 2019-08-30 NOTE — PROGRESS NOTES
Subjective:       Patient ID: Gregg Mcbride is a 68 y.o. male.    Chief Complaint: Annual Exam    Last seen 15 months ago. Returns for annual exam and f/u chronic medical conditions. Urgent care a month ago for low back pain after falling off his riding mower, no imaging required, this has subsided.     PMH:   Hypertension. Ex Stress Echo 3/11 negative for ischemia, LVH with EF 65%.  Hyperlipidemia. LDL 76 .   Morbid Obesity, TSH normal 10/13.   Prostate Cancer diagnosed , followed by Rad/[facility]. PSA 1.2 .   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  Left Renal Cell Carcinoma resected.   Abdominal Aorta Atherosclerosis, mild - on CT Abdomen .  H/O Lumbar spine injury, work related.   Normocytic Anemia, mild.   Elevated Glucose, HbA1c 5.4% .   Erectile Dysfunction.     Eye exam . Colonoscopy normal  (polyps in past). Flu shot 10/17. Pneumovax . Prevnar . Tdap .     PSH: Lithotripsy. Ureteroscopic stone removal . Right IHR. Right foot surgery, pin in great toe due to crush injury. Partial Left Nephrectomy .    Social: Tobacco use - occasional cigarette. Alcohol - 1-2 beers, [place] on weekends. , wife is a liver transplant recipient. Adult daughter and son both live locally. Retired  at [employer]. Works part-time cutting grass.     FMH: Father  age 53 of MI, nonsmoker. Mother living age 90, health unknown (does take a lot of medications). Patient is the second of 12 children, some are diabetic.     Allergies: Sulfa - rash.    Medications: list reviewed and reconciled.       Review of Systems   Constitutional: Negative for activity change, appetite change, chills, fatigue, fever and unexpected weight change.   HENT: Negative for congestion, ear pain, hearing loss, postnasal drip, rhinorrhea, sore throat, trouble swallowing and voice change.    Eyes: Negative for pain and visual disturbance.   Respiratory: Negative for  "apnea, cough, chest tightness, shortness of breath and wheezing.    Cardiovascular: Positive for leg swelling. Negative for chest pain and palpitations.   Gastrointestinal: Negative for abdominal pain, blood in stool, constipation, diarrhea, nausea and vomiting.   Genitourinary: Negative for difficulty urinating, dysuria, frequency, hematuria, scrotal swelling and urgency.   Musculoskeletal: Positive for arthralgias. Negative for back pain, gait problem, myalgias, neck pain and neck stiffness.   Skin: Negative for color change and rash.   Neurological: Negative for dizziness, seizures, syncope, weakness, numbness and headaches.   Hematological: Negative for adenopathy. Does not bruise/bleed easily.   Psychiatric/Behavioral: Negative for agitation, dysphoric mood and sleep disturbance. The patient is not nervous/anxious.        Objective:    /78, Pulse 78, Ht 5' 9", Wt 322 lbs (stable), BMI=47.5  Physical Exam   Constitutional: He is oriented to person, place, and time. He appears well-developed and well-nourished. No distress.   HENT:   Head: Normocephalic and atraumatic.   Right Ear: External ear normal.   Left Ear: External ear normal.   Nose: Nose normal.   Mouth/Throat: Oropharynx is clear and moist.   Eyes: Pupils are equal, round, and reactive to light. Conjunctivae and EOM are normal. No scleral icterus.   Neck: Normal range of motion. Neck supple. No JVD present. Carotid bruit is not present. No thyromegaly present.   Cardiovascular: Normal rate, regular rhythm, normal heart sounds and intact distal pulses. Exam reveals no gallop and no friction rub.   No murmur heard.  Pulmonary/Chest: Effort normal and breath sounds normal. No respiratory distress. He has no wheezes. He has no rales.   Abdominal: Soft. Bowel sounds are normal. He exhibits no distension and no mass. There is no tenderness.   Musculoskeletal: Normal range of motion. He exhibits no tenderness or deformity.   Mild edema left > right with " no stasis dermatitis or ulceration.   Lymphadenopathy:     He has no cervical adenopathy.   Neurological: He is alert and oriented to person, place, and time. He has normal reflexes. No cranial nerve deficit. He exhibits normal muscle tone. Coordination normal.   Skin: Skin is warm and dry. No rash noted. He is not diaphoretic.   Psychiatric: He has a normal mood and affect. His behavior is normal. Judgment and thought content normal.       Assessment:       1. Hypertensive heart disease without heart failure    2. Hyperlipidemia, unspecified hyperlipidemia type    3. Pre-diabetes    4. CKD (chronic kidney disease) stage 3, GFR 30-59 ml/min    5. Chronic venous insufficiency    6. Morbid obesity with BMI of 45.0-49.9, adult    7. Need for shingles vaccine        Plan:       Hypertensive heart disease without heart failure - controlled.   -     CBC auto differential; Future; Expected date: 08/30/2019  -     Comprehensive metabolic panel; Future; Expected date: 08/30/2019  -     losartan (COZAAR) 100 MG tablet; Take 1 tablet (100 mg total) by mouth once daily.  Dispense: 90 tablet; Refill: 1  -     hydroCHLOROthiazide (HYDRODIURIL) 25 MG tablet; Take 1 tablet (25 mg total) by mouth once daily.  Dispense: 90 tablet; Refill: 1  -     carvedilol (COREG) 12.5 MG tablet; TAKE ONE TABLET BY MOUTH TWICE DAILY  Dispense: 180 tablet; Refill: 1  -     Ambulatory referral to Optometry    Hyperlipidemia, unspecified hyperlipidemia type  -     Lipid panel; Future; Expected date: 08/30/2019  -     atorvastatin (LIPITOR) 20 MG tablet; Take 1 tablet (20 mg total) by mouth once daily.  Dispense: 90 tablet; Refill: 1    Pre-diabetes  -     Hemoglobin A1c; Future; Expected date: 08/30/2019    Atherosclerosis of abdominal aorta  -     Increase Atorvastatin (LIPITOR) from 10 to 20 MG tablet; Take 1 tablet (20 mg total) by mouth once daily.  Dispense: 90 tablet; Refill: 1    CKD (chronic kidney disease) stage 3, GFR 30-59 ml/min - labs as  above.     Chronic venous insufficiency  -     spironolactone (ALDACTONE) 25 MG tablet; Take 1 tablet (25 mg total) by mouth once daily.  Dispense: 90 tablet; Refill: 1    Erectile dysfunction, unspecified erectile dysfunction type  -     sildenafil (VIAGRA) 100 MG tablet; Take 1 tablet (100 mg total) by mouth daily as needed for Erectile Dysfunction.  Dispense: 6 tablet; Refill: 3    Morbid obesity with BMI of 45.0-49.9, adult - counseled on diet, has been eating a lot of fried food.     Need for shingles vaccine - Shingrix from the pharmacy today.    Flu shot when available.

## 2019-11-18 ENCOUNTER — PATIENT OUTREACH (OUTPATIENT)
Dept: ADMINISTRATIVE | Facility: OTHER | Age: 69
End: 2019-11-18

## 2019-11-20 ENCOUNTER — OFFICE VISIT (OUTPATIENT)
Dept: OPTOMETRY | Facility: CLINIC | Age: 69
End: 2019-11-20
Payer: MEDICARE

## 2019-11-20 DIAGNOSIS — H25.13 NUCLEAR SCLEROSIS OF BOTH EYES: Primary | ICD-10-CM

## 2019-11-20 DIAGNOSIS — H02.402 PTOSIS OF EYELID, LEFT: ICD-10-CM

## 2019-11-20 DIAGNOSIS — H52.4 MYOPIA WITH ASTIGMATISM AND PRESBYOPIA, BILATERAL: ICD-10-CM

## 2019-11-20 DIAGNOSIS — H52.203 MYOPIA WITH ASTIGMATISM AND PRESBYOPIA, BILATERAL: ICD-10-CM

## 2019-11-20 DIAGNOSIS — Z87.828 HISTORY OF EYE INJURY: ICD-10-CM

## 2019-11-20 DIAGNOSIS — H52.13 MYOPIA WITH ASTIGMATISM AND PRESBYOPIA, BILATERAL: ICD-10-CM

## 2019-11-20 PROCEDURE — 99999 PR PBB SHADOW E&M-EST. PATIENT-LVL II: CPT | Mod: PBBFAC,,, | Performed by: OPTOMETRIST

## 2019-11-20 PROCEDURE — 92015 DETERMINE REFRACTIVE STATE: CPT | Mod: S$GLB,,, | Performed by: OPTOMETRIST

## 2019-11-20 PROCEDURE — 99999 PR PBB SHADOW E&M-EST. PATIENT-LVL II: ICD-10-PCS | Mod: PBBFAC,,, | Performed by: OPTOMETRIST

## 2019-11-20 PROCEDURE — 92004 COMPRE OPH EXAM NEW PT 1/>: CPT | Mod: S$GLB,,, | Performed by: OPTOMETRIST

## 2019-11-20 PROCEDURE — 92015 PR REFRACTION: ICD-10-PCS | Mod: S$GLB,,, | Performed by: OPTOMETRIST

## 2019-11-20 PROCEDURE — 99499 UNLISTED E&M SERVICE: CPT | Mod: S$GLB,,, | Performed by: OPTOMETRIST

## 2019-11-20 PROCEDURE — 99499 RISK ADDL DX/OHS AUDIT: ICD-10-PCS | Mod: S$GLB,,, | Performed by: OPTOMETRIST

## 2019-11-20 PROCEDURE — 92004 PR EYE EXAM, NEW PATIENT,COMPREHESV: ICD-10-PCS | Mod: S$GLB,,, | Performed by: OPTOMETRIST

## 2019-11-20 NOTE — PROGRESS NOTES
HPI     Pt here for routine eye exam and states it has been a few years since his   last exam. Pt states no complaints today and does okay with OTC readers.   Pt states he has a hx of blunt trauma to lefty eye and eyelid was stitched   up. Pt has hypertensive heart dz and is pre-diabetic per his PCP.    Last edited by Jeri Goodwin MA on 11/20/2019 10:26 AM. (History)            Assessment /Plan     For exam results, see Encounter Report.    Nuclear sclerosis of both eyes    Myopia with astigmatism and presbyopia, bilateral    History of eye injury    Ptosis of eyelid, left      1. Nuclear sclerotic cataract - not visually significant. Observe.  2. SRx released to patient. Patient educated on lens options. Normal ocular health. RTC 1 year for routine exam.   3-4. Incidence was 13 years ago. Pt has slight ptosis OS as a result. No visual pathology noted today.

## 2020-01-12 ENCOUNTER — PATIENT OUTREACH (OUTPATIENT)
Dept: ADMINISTRATIVE | Facility: OTHER | Age: 70
End: 2020-01-12

## 2020-01-13 ENCOUNTER — LAB VISIT (OUTPATIENT)
Dept: LAB | Facility: HOSPITAL | Age: 70
End: 2020-01-13
Attending: UROLOGY
Payer: MEDICARE

## 2020-01-13 ENCOUNTER — OFFICE VISIT (OUTPATIENT)
Dept: UROLOGY | Facility: CLINIC | Age: 70
End: 2020-01-13
Payer: MEDICARE

## 2020-01-13 VITALS
HEART RATE: 64 BPM | DIASTOLIC BLOOD PRESSURE: 69 MMHG | HEIGHT: 69 IN | WEIGHT: 315 LBS | SYSTOLIC BLOOD PRESSURE: 115 MMHG | BODY MASS INDEX: 46.65 KG/M2

## 2020-01-13 DIAGNOSIS — N20.0 CALCULUS OF KIDNEY: ICD-10-CM

## 2020-01-13 DIAGNOSIS — R97.20 ELEVATED PSA: ICD-10-CM

## 2020-01-13 DIAGNOSIS — C61 CANCER OF PROSTATE: Primary | ICD-10-CM

## 2020-01-13 DIAGNOSIS — C61 CANCER OF PROSTATE: ICD-10-CM

## 2020-01-13 DIAGNOSIS — N20.0 NEPHROLITHIASIS: ICD-10-CM

## 2020-01-13 LAB
ERYTHROCYTE [DISTWIDTH] IN BLOOD BY AUTOMATED COUNT: 12.3 % (ref 11.5–14.5)
HCT VFR BLD AUTO: 40.3 % (ref 40–54)
HGB BLD-MCNC: 12.8 G/DL (ref 14–18)
MCH RBC QN AUTO: 30.1 PG (ref 27–31)
MCHC RBC AUTO-ENTMCNC: 31.8 G/DL (ref 32–36)
MCV RBC AUTO: 95 FL (ref 82–98)
PLATELET # BLD AUTO: 294 K/UL (ref 150–350)
PMV BLD AUTO: 10.8 FL (ref 9.2–12.9)
RBC # BLD AUTO: 4.25 M/UL (ref 4.6–6.2)
WBC # BLD AUTO: 4.68 K/UL (ref 3.9–12.7)

## 2020-01-13 PROCEDURE — 99499 UNLISTED E&M SERVICE: CPT | Mod: S$GLB,,, | Performed by: UROLOGY

## 2020-01-13 PROCEDURE — 1126F PR PAIN SEVERITY QUANTIFIED, NO PAIN PRESENT: ICD-10-PCS | Mod: S$GLB,,, | Performed by: UROLOGY

## 2020-01-13 PROCEDURE — 1159F PR MEDICATION LIST DOCUMENTED IN MEDICAL RECORD: ICD-10-PCS | Mod: S$GLB,,, | Performed by: UROLOGY

## 2020-01-13 PROCEDURE — 3078F DIAST BP <80 MM HG: CPT | Mod: CPTII,S$GLB,, | Performed by: UROLOGY

## 2020-01-13 PROCEDURE — 3074F SYST BP LT 130 MM HG: CPT | Mod: CPTII,S$GLB,, | Performed by: UROLOGY

## 2020-01-13 PROCEDURE — 1101F PR PT FALLS ASSESS DOC 0-1 FALLS W/OUT INJ PAST YR: ICD-10-PCS | Mod: CPTII,S$GLB,, | Performed by: UROLOGY

## 2020-01-13 PROCEDURE — 99499 RISK ADDL DX/OHS AUDIT: ICD-10-PCS | Mod: S$GLB,,, | Performed by: UROLOGY

## 2020-01-13 PROCEDURE — 3078F PR MOST RECENT DIASTOLIC BLOOD PRESSURE < 80 MM HG: ICD-10-PCS | Mod: CPTII,S$GLB,, | Performed by: UROLOGY

## 2020-01-13 PROCEDURE — 3074F PR MOST RECENT SYSTOLIC BLOOD PRESSURE < 130 MM HG: ICD-10-PCS | Mod: CPTII,S$GLB,, | Performed by: UROLOGY

## 2020-01-13 PROCEDURE — 84153 ASSAY OF PSA TOTAL: CPT

## 2020-01-13 PROCEDURE — 84403 ASSAY OF TOTAL TESTOSTERONE: CPT

## 2020-01-13 PROCEDURE — 99999 PR PBB SHADOW E&M-EST. PATIENT-LVL IV: ICD-10-PCS | Mod: PBBFAC,,, | Performed by: UROLOGY

## 2020-01-13 PROCEDURE — 99214 PR OFFICE/OUTPT VISIT, EST, LEVL IV, 30-39 MIN: ICD-10-PCS | Mod: S$GLB,,, | Performed by: UROLOGY

## 2020-01-13 PROCEDURE — 1101F PT FALLS ASSESS-DOCD LE1/YR: CPT | Mod: CPTII,S$GLB,, | Performed by: UROLOGY

## 2020-01-13 PROCEDURE — 85027 COMPLETE CBC AUTOMATED: CPT

## 2020-01-13 PROCEDURE — 99214 OFFICE O/P EST MOD 30 MIN: CPT | Mod: S$GLB,,, | Performed by: UROLOGY

## 2020-01-13 PROCEDURE — 36415 COLL VENOUS BLD VENIPUNCTURE: CPT | Mod: PO

## 2020-01-13 PROCEDURE — 1159F MED LIST DOCD IN RCRD: CPT | Mod: S$GLB,,, | Performed by: UROLOGY

## 2020-01-13 PROCEDURE — 1126F AMNT PAIN NOTED NONE PRSNT: CPT | Mod: S$GLB,,, | Performed by: UROLOGY

## 2020-01-13 PROCEDURE — 80053 COMPREHEN METABOLIC PANEL: CPT

## 2020-01-13 PROCEDURE — 99999 PR PBB SHADOW E&M-EST. PATIENT-LVL IV: CPT | Mod: PBBFAC,,, | Performed by: UROLOGY

## 2020-01-13 RX ORDER — POTASSIUM CITRATE 10 MEQ/1
10 TABLET, EXTENDED RELEASE ORAL 2 TIMES DAILY WITH MEALS
Qty: 180 TABLET | Refills: 3 | Status: SHIPPED | OUTPATIENT
Start: 2020-01-13 | End: 2021-01-12

## 2020-01-13 NOTE — PROGRESS NOTES
CC: overdue lupron injection for prostate cancer    Gregg Mcbride is a 69 y.o. man who is here for the evaluation of Prostate Cancer    I did his TRUS bx of prostate on 6/12/18 for rising PSA since XRT combined with ADT back in 2010.  FINAL PATHOLOGIC DIAGNOSIS  PROSTATE BIOPSIES 1, 2, 3, 4, 5, AND 6:  NO DISTINCT RESIDUAL CARCINOMA IDENTIFIED  PICTURE CONSISTENT WITH PRIOR THERAPY    His pre-treatment PSA was 31.  TRUS bx of prostate showed [place] 8 and 9 (4+5) on 1/22/2010.  The patient received an Eligard injection on 2/4/10.  He completed radiotherapy to the prostate and pelvic nodes on 7/1/10. His last Eligard injection was given in August of 2012.    Following his combo therapy, his PSA remained undetectable until 6/2015.  Then starting 2/2016, his PSA is detectable with PSA greater than 0.2.  However, his PSA double time has been about 12 months.  His PSA has risen to 1.3 on 10/25/18 from 0.85 on 2/2/18.    In addition,he has a hx of kidney stone disease with hx of left ureteral stricture.  MAG 3 renal scan doneon 11/29/2011 showed 95% function on the right kidney and 5 % kidney function with abnormal respond to lasix.    Hx of RCC, s/p partial left nephrectomy on 5/10/11 for RCC clear cell type (pT1a No Mo).    He is on flomax for his LUTS and reports no problem with urination.    Denies flank pain, dysuira, hematuria.    Nocturia 2 to 3 x.    He is here with CT RSS and blood tests.    Last Lurpon injection 5/13/2019.  He is overdue for Lupron injection.      Past Medical History:   Diagnosis Date    Arthritis     Cancer 5/2011    Kidney    Cataract     Chronic kidney disease     stones    Diabetes mellitus     History of colon polyps     Hypertension     Morbid obesity with BMI of 50.0-59.9, adult     Prostate cancer      Past Surgical History:   Procedure Laterality Date    COLONOSCOPY N/A 5/19/2016    Procedure: COLONOSCOPY;  Surgeon: Itz Simeon MD;  Location: [facility] ([address] [place] [address]);   Service: Endoscopy;  Laterality: N/A;  Last colonoscopy 2011 with Dr. Simeon    HERNIA REPAIR      KIDNEY SURGERY  2011     Social History     Tobacco Use    Smoking status: Never Smoker    Smokeless tobacco: Never Used   Substance Use Topics    Alcohol use: Yes     Alcohol/week: 0.8 standard drinks     Types: 1 Standard drinks or equivalent per week     Comment: socially    Drug use: No     Family History   Problem Relation Age of Onset    Diabetes Mother     Hypertension Mother     Coronary artery disease Father     Hypertension Sister     Hypertension Brother     Amblyopia Neg Hx     Blindness Neg Hx     Cataracts Neg Hx     Glaucoma Neg Hx     Macular degeneration Neg Hx     Retinal detachment Neg Hx     Strabismus Neg Hx      Allergy:  Review of patient's allergies indicates:   Allergen Reactions    Sulfa (sulfonamide antibiotics) Rash     Outpatient Encounter Medications as of 1/13/2020   Medication Sig Dispense Refill    atorvastatin (LIPITOR) 20 MG tablet Take 1 tablet (20 mg total) by mouth once daily. 90 tablet 1    carvedilol (COREG) 12.5 MG tablet TAKE ONE TABLET BY MOUTH TWICE DAILY 180 tablet 1    hydroCHLOROthiazide (HYDRODIURIL) 25 MG tablet Take 1 tablet (25 mg total) by mouth once daily. 90 tablet 1    losartan (COZAAR) 100 MG tablet Take 1 tablet (100 mg total) by mouth once daily. 90 tablet 1    methocarbamol (ROBAXIN) 750 MG Tab Take 1 tablet (750 mg total) by mouth 3 (three) times daily. 20 tablet 0    potassium citrate (UROCIT-K 10) 10 mEq (1,080 mg) TbSR Take 1 tablet (10 mEq total) by mouth 2 (two) times daily with meals. 180 tablet 3    sildenafil (VIAGRA) 100 MG tablet Take 1 tablet (100 mg total) by mouth daily as needed for Erectile Dysfunction. 6 tablet 3    spironolactone (ALDACTONE) 25 MG tablet Take 1 tablet (25 mg total) by mouth once daily. 90 tablet 1    tamsulosin (FLOMAX) 0.4 mg Cap Take 1 capsule (0.4 mg total) by mouth once daily. 90 capsule 3     [DISCONTINUED] potassium citrate (UROCIT-K 10) 10 mEq (1,080 mg) TbSR Take 1 tablet (10 mEq total) by mouth 2 (two) times daily with meals. 180 tablet 3    calcium crb,cit-D3-tfs11-wdpey (CITRACAL + BONE DENSITY) 300-200-13.5 mg-unit-mg Tab Take 1 tablet by mouth once daily. 120 tablet 3     Facility-Administered Encounter Medications as of 1/13/2020   Medication Dose Route Frequency Provider Last Rate Last Dose    leuprolide (6 month) injection 45 mg  45 mg Intramuscular Q6 Months Lance Padron MD        leuprolide (6 month) SyKt 45 mg  45 mg Intramuscular Q6 Months Lance Padron MD   45 mg at 11/09/18 1018    leuprolide (6 month) SyKt 45 mg  45 mg Intramuscular Q6 Months Lance Padron MD        leuprolide (6 month) SyKt 45 mg  45 mg Intramuscular Q6 Months Lance Padron MD   45 mg at 05/13/19 1308    leuprolide (6 month) SyKt 45 mg  45 mg Intramuscular Q6 Months Lance Padron MD        lidocaine HCL 2% jelly   Topical (Top) Once Lance Padron MD         Review of Systems   General ROS: GENERAL:  No weight gain or loss  SKIN:  No rashes or lacerations  HEAD:  No headaches  EYES:  No exophthalmos, jaundice or blindness  EARS:  No dizziness, tinnitus or hearing loss  NOSE:  No changes in smell  MOUTH & THROAT:  No dyskinetic movements or obvious goiter  CHEST:  No shortness of breath, hyperventilation or cough  CARDIOVASCULAR:  No tachycardia or chest pain  ABDOMEN:  No nausea, vomiting, pain, constipation or diarrhea  URINARY:  No frequency, dysuria or sexual dysfunction  ENDOCRINE:  No polydipsia, polyuria  MUSCULOSKELETAL:  No pain or stiffness of the joints  NEUROLOGIC:  No weakness, sensory changes, seizures, confusion, memory loss, tremor or other abnormal movements  Physical Exam     Vitals:    01/13/20 1433   BP: 115/69   Pulse: 64     Physical Exam  Genitalia:  Scrotum: no rash or lesion  Normal symmetric epididymis without masses  Normal vas palpated  Normal size, symmetric testicles with no  masses   Normal urethral meatus with no discharge  Normal circumcised penis with no lesion   Rectal:  Normal perineum and anus upon inspection.  Normal tone, no masses or tenderness;     LABS:  Lab Results   Component Value Date    PSA 0.05 10/02/2013    PSA 0.06 01/28/2013    PSA 0.07 08/15/2012    PSADIAG 1.2 04/26/2019    PSADIAG 1.3 10/25/2018    PSADIAG 0.85 06/01/2018     Lab Results   Component Value Date    CREATININE 1.4 08/30/2019    CREATININE 1.2 04/26/2019    CREATININE 1.4 10/25/2018     Results for orders placed or performed in visit on 02/02/18   Testosterone   Result Value Ref Range    Testosterone, Total 282 195.0 - 1138.0 ng/dL     Urine Culture, Routine   Date Value Ref Range Status   12/02/2011   Final    MULTIPLE ORGANISMS ISOLATED. NONE IN PREDOMINANCE. REPEAT IF CLINICALLY NECESSARY.     Radiology:  Bone scan 4/2/18  Patient was administered 27.3 millicuries of technetium 99 M labeled MDP intravenously.  There is degenerative activity of the shoulders, wrists, hands, knees, ankles, and feet.  There are bilateral genu varus deformities from severe DJD.  There is poorly functioning left kidney.  Right kidney is normal.  There is no evidence of metastatic disease.  There is mild DJD of the C-spine.    MAG 3 renal scan 4/121/18  1.  Normal function and response to lasix of  the right kidney.  2. Persistent, severely decreased function of the left kidney with poor  response to lasix.  Not significantly changed compared to prior scan.  3. Differential function left kidney 12%, right kidney 88%. Not changed   Significantly when compared to last time.    CT RSS 4/26/19  1. Bilateral nonobstructing renal stones, 1 on the left, measuring 0.4 cm, and at least 4 on the right, the largest measuring 0.5 cm.  2.  Partial left nephrectomy with a residual atrophic left kidney.  Two stable left renal cysts.    Assessment and Plan:  Gregg was seen today for prostate cancer.    Diagnoses and all orders for this  visit:    Cancer of prostate  -     Prostate Specific Antigen, Diagnostic; Future  -     Testosterone; Future  -     Prior Authorization Order  -     leuprolide (6 month) injection 45 mg  -     calcium crb,cit-D3-jhg55-fnpak (CITRACAL + BONE DENSITY) 300-200-13.5 mg-unit-mg Tab; Take 1 tablet by mouth once daily.  -     Comprehensive metabolic panel; Future  -     Comprehensive metabolic panel; Future    Calculus of kidney    Elevated PSA  -     Prostate Specific Antigen, Diagnostic; Future    Nephrolithiasis  -     potassium citrate (UROCIT-K 10) 10 mEq (1,080 mg) TbSR; Take 1 tablet (10 mEq total) by mouth 2 (two) times daily with meals.  -     Comprehensive metabolic panel; Future  -     Comprehensive metabolic panel; Future    he did not follow up in !!/2019 for lupron injection for his prostate cancer.    Hx of chemical failure with rising PSA following definite XRT combined with ADT for 2 years for high grade prostate cancer.  No evidence of metastatic disease noted.  No definite recurrent prostate cancer noted on TRUS bx of prostate on 6/12/18.    But his PSA doubling time is getting less than 1 year.  Thus we decided to start ADT.  His is stable since we started Luorn injeciotn.  Will continue Lupron ADT.  Lupron injection ordered today.  Continue citracal daily.    For his kidney stone, stable stone burden on CT RSS.  I reviewed his CT today with him.  Continue Urocit K BID for his kidney stone disease but can't be aggressive in treatment due to his decreased renal function.    I spent 25 minutes with the patient of which more than half was spent in direct consultation with the patient in regards to our treatment and plan.      Follow-up:  Follow up lupron injection.

## 2020-01-14 LAB
ALBUMIN SERPL BCP-MCNC: 3.7 G/DL (ref 3.5–5.2)
ALP SERPL-CCNC: 102 U/L (ref 55–135)
ALT SERPL W/O P-5'-P-CCNC: 24 U/L (ref 10–44)
ANION GAP SERPL CALC-SCNC: 10 MMOL/L (ref 8–16)
AST SERPL-CCNC: 19 U/L (ref 10–40)
BILIRUB SERPL-MCNC: 0.4 MG/DL (ref 0.1–1)
BUN SERPL-MCNC: 23 MG/DL (ref 8–23)
CALCIUM SERPL-MCNC: 9.6 MG/DL (ref 8.7–10.5)
CHLORIDE SERPL-SCNC: 100 MMOL/L (ref 95–110)
CO2 SERPL-SCNC: 28 MMOL/L (ref 23–29)
COMPLEXED PSA SERPL-MCNC: 1.4 NG/ML (ref 0–4)
CREAT SERPL-MCNC: 1.4 MG/DL (ref 0.5–1.4)
EST. GFR  (AFRICAN AMERICAN): 58 ML/MIN/1.73 M^2
EST. GFR  (NON AFRICAN AMERICAN): 50.2 ML/MIN/1.73 M^2
GLUCOSE SERPL-MCNC: 109 MG/DL (ref 70–110)
POTASSIUM SERPL-SCNC: 4.4 MMOL/L (ref 3.5–5.1)
PROT SERPL-MCNC: 7.8 G/DL (ref 6–8.4)
SODIUM SERPL-SCNC: 138 MMOL/L (ref 136–145)
TESTOST SERPL-MCNC: 26 NG/DL (ref 304–1227)

## 2020-01-22 ENCOUNTER — PATIENT OUTREACH (OUTPATIENT)
Dept: ADMINISTRATIVE | Facility: OTHER | Age: 70
End: 2020-01-22

## 2020-01-22 NOTE — PROGRESS NOTES
Care Everywhere:n/a  Immunization: updated  Health Maintenance: updated  Media Review: n/a  Legacy Review: n/a  Order placed:n/a  Upcoming appts:n/a

## 2020-01-23 ENCOUNTER — CLINICAL SUPPORT (OUTPATIENT)
Dept: UROLOGY | Facility: CLINIC | Age: 70
End: 2020-01-23
Payer: MEDICARE

## 2020-01-23 PROCEDURE — 99999 PR PBB SHADOW E&M-EST. PATIENT-LVL I: CPT | Mod: PBBFAC,,, | Performed by: UROLOGY

## 2020-01-23 PROCEDURE — 96402 PR CHEMOTHER HORMON ANTINEOPL SUB-Q/IM: ICD-10-PCS | Mod: S$GLB,,, | Performed by: UROLOGY

## 2020-01-23 PROCEDURE — 99999 PR PBB SHADOW E&M-EST. PATIENT-LVL I: ICD-10-PCS | Mod: PBBFAC,,, | Performed by: UROLOGY

## 2020-01-23 PROCEDURE — 96402 CHEMO HORMON ANTINEOPL SQ/IM: CPT | Mod: S$GLB,,, | Performed by: UROLOGY

## 2020-03-05 ENCOUNTER — PATIENT OUTREACH (OUTPATIENT)
Dept: ADMINISTRATIVE | Facility: HOSPITAL | Age: 70
End: 2020-03-05

## 2020-03-05 NOTE — PROGRESS NOTES
Pre-visit chart review completed.  Immunizations reviewed and updated.    Patient due for the following    Influenza Vaccine (1)

## 2020-03-16 DIAGNOSIS — I11.9 HYPERTENSIVE HEART DISEASE WITHOUT HEART FAILURE: ICD-10-CM

## 2020-03-16 DIAGNOSIS — E78.5 HYPERLIPIDEMIA, UNSPECIFIED HYPERLIPIDEMIA TYPE: ICD-10-CM

## 2020-03-16 DIAGNOSIS — I87.2 CHRONIC VENOUS INSUFFICIENCY: ICD-10-CM

## 2020-03-16 DIAGNOSIS — I70.0 ATHEROSCLEROSIS OF ABDOMINAL AORTA: ICD-10-CM

## 2020-03-16 RX ORDER — SPIRONOLACTONE 25 MG/1
TABLET ORAL
Qty: 90 TABLET | Refills: 1 | Status: SHIPPED | OUTPATIENT
Start: 2020-03-16 | End: 2020-10-23

## 2020-03-16 RX ORDER — CARVEDILOL 12.5 MG/1
TABLET ORAL
Qty: 180 TABLET | Refills: 1 | Status: SHIPPED | OUTPATIENT
Start: 2020-03-16 | End: 2020-10-23

## 2020-03-16 RX ORDER — ATORVASTATIN CALCIUM 20 MG/1
TABLET, FILM COATED ORAL
Qty: 90 TABLET | Refills: 1 | Status: SHIPPED | OUTPATIENT
Start: 2020-03-16 | End: 2020-09-16

## 2020-04-29 DIAGNOSIS — I11.9 HYPERTENSIVE HEART DISEASE WITHOUT HEART FAILURE: ICD-10-CM

## 2020-04-29 RX ORDER — LOSARTAN POTASSIUM 100 MG/1
TABLET ORAL
Qty: 90 TABLET | Refills: 0 | Status: SHIPPED | OUTPATIENT
Start: 2020-04-29 | End: 2020-07-24

## 2020-06-04 DIAGNOSIS — C61 CANCER OF PROSTATE: ICD-10-CM

## 2020-06-04 NOTE — TELEPHONE ENCOUNTER
----- Message from Anika De León sent at 6/4/2020  4:05 PM CDT -----  Contact: Pharmacy [phone]  tamsulosin (FLOMAX) 0.4 mg Cap refill request       [id number] - [place] - [address] [facility] [address] [facility]  [address] [address] [facility] [address]  Phone: [phone] Fax: [phone]

## 2020-06-05 RX ORDER — TAMSULOSIN HYDROCHLORIDE 0.4 MG/1
1 CAPSULE ORAL DAILY
Qty: 90 CAPSULE | Refills: 3 | Status: SHIPPED | OUTPATIENT
Start: 2020-06-05

## 2020-07-27 ENCOUNTER — LAB VISIT (OUTPATIENT)
Dept: LAB | Facility: HOSPITAL | Age: 70
End: 2020-07-27
Attending: UROLOGY
Payer: MEDICARE

## 2020-07-27 ENCOUNTER — OFFICE VISIT (OUTPATIENT)
Dept: UROLOGY | Facility: CLINIC | Age: 70
End: 2020-07-27
Payer: MEDICARE

## 2020-07-27 VITALS
WEIGHT: 315 LBS | HEIGHT: 69 IN | HEART RATE: 56 BPM | BODY MASS INDEX: 46.65 KG/M2 | DIASTOLIC BLOOD PRESSURE: 79 MMHG | SYSTOLIC BLOOD PRESSURE: 137 MMHG

## 2020-07-27 DIAGNOSIS — C61 PROSTATE CANCER: ICD-10-CM

## 2020-07-27 DIAGNOSIS — C61 PROSTATE CANCER: Primary | ICD-10-CM

## 2020-07-27 LAB
ALBUMIN SERPL BCP-MCNC: 3.7 G/DL (ref 3.5–5.2)
ALP SERPL-CCNC: 85 U/L (ref 55–135)
ALT SERPL W/O P-5'-P-CCNC: 22 U/L (ref 10–44)
ANION GAP SERPL CALC-SCNC: 7 MMOL/L (ref 8–16)
AST SERPL-CCNC: 21 U/L (ref 10–40)
BILIRUB SERPL-MCNC: 0.6 MG/DL (ref 0.1–1)
BUN SERPL-MCNC: 11 MG/DL (ref 8–23)
CALCIUM SERPL-MCNC: 9.6 MG/DL (ref 8.7–10.5)
CHLORIDE SERPL-SCNC: 100 MMOL/L (ref 95–110)
CO2 SERPL-SCNC: 31 MMOL/L (ref 23–29)
COMPLEXED PSA SERPL-MCNC: 1.2 NG/ML (ref 0–4)
CREAT SERPL-MCNC: 1.3 MG/DL (ref 0.5–1.4)
EST. GFR  (AFRICAN AMERICAN): >60 ML/MIN/1.73 M^2
EST. GFR  (NON AFRICAN AMERICAN): 54.9 ML/MIN/1.73 M^2
GLUCOSE SERPL-MCNC: 135 MG/DL (ref 70–110)
POTASSIUM SERPL-SCNC: 4.9 MMOL/L (ref 3.5–5.1)
PROT SERPL-MCNC: 7.6 G/DL (ref 6–8.4)
SODIUM SERPL-SCNC: 138 MMOL/L (ref 136–145)

## 2020-07-27 PROCEDURE — 1101F PR PT FALLS ASSESS DOC 0-1 FALLS W/OUT INJ PAST YR: ICD-10-PCS | Mod: CPTII,S$GLB,, | Performed by: UROLOGY

## 2020-07-27 PROCEDURE — 3078F PR MOST RECENT DIASTOLIC BLOOD PRESSURE < 80 MM HG: ICD-10-PCS | Mod: CPTII,S$GLB,, | Performed by: UROLOGY

## 2020-07-27 PROCEDURE — 3075F SYST BP GE 130 - 139MM HG: CPT | Mod: CPTII,S$GLB,, | Performed by: UROLOGY

## 2020-07-27 PROCEDURE — 80053 COMPREHEN METABOLIC PANEL: CPT

## 2020-07-27 PROCEDURE — 1126F AMNT PAIN NOTED NONE PRSNT: CPT | Mod: S$GLB,,, | Performed by: UROLOGY

## 2020-07-27 PROCEDURE — 1101F PT FALLS ASSESS-DOCD LE1/YR: CPT | Mod: CPTII,S$GLB,, | Performed by: UROLOGY

## 2020-07-27 PROCEDURE — 99499 UNLISTED E&M SERVICE: CPT | Mod: S$GLB,,, | Performed by: UROLOGY

## 2020-07-27 PROCEDURE — 99214 OFFICE O/P EST MOD 30 MIN: CPT | Mod: S$GLB,,, | Performed by: UROLOGY

## 2020-07-27 PROCEDURE — 99214 PR OFFICE/OUTPT VISIT, EST, LEVL IV, 30-39 MIN: ICD-10-PCS | Mod: S$GLB,,, | Performed by: UROLOGY

## 2020-07-27 PROCEDURE — 99499 RISK ADDL DX/OHS AUDIT: ICD-10-PCS | Mod: S$GLB,,, | Performed by: UROLOGY

## 2020-07-27 PROCEDURE — 1159F MED LIST DOCD IN RCRD: CPT | Mod: S$GLB,,, | Performed by: UROLOGY

## 2020-07-27 PROCEDURE — 3008F BODY MASS INDEX DOCD: CPT | Mod: CPTII,S$GLB,, | Performed by: UROLOGY

## 2020-07-27 PROCEDURE — 99999 PR PBB SHADOW E&M-EST. PATIENT-LVL IV: CPT | Mod: PBBFAC,,, | Performed by: UROLOGY

## 2020-07-27 PROCEDURE — 1159F PR MEDICATION LIST DOCUMENTED IN MEDICAL RECORD: ICD-10-PCS | Mod: S$GLB,,, | Performed by: UROLOGY

## 2020-07-27 PROCEDURE — 99999 PR PBB SHADOW E&M-EST. PATIENT-LVL IV: ICD-10-PCS | Mod: PBBFAC,,, | Performed by: UROLOGY

## 2020-07-27 PROCEDURE — 1126F PR PAIN SEVERITY QUANTIFIED, NO PAIN PRESENT: ICD-10-PCS | Mod: S$GLB,,, | Performed by: UROLOGY

## 2020-07-27 PROCEDURE — 3075F PR MOST RECENT SYSTOLIC BLOOD PRESS GE 130-139MM HG: ICD-10-PCS | Mod: CPTII,S$GLB,, | Performed by: UROLOGY

## 2020-07-27 PROCEDURE — 36415 COLL VENOUS BLD VENIPUNCTURE: CPT | Mod: PO

## 2020-07-27 PROCEDURE — 3008F PR BODY MASS INDEX (BMI) DOCUMENTED: ICD-10-PCS | Mod: CPTII,S$GLB,, | Performed by: UROLOGY

## 2020-07-27 PROCEDURE — 3078F DIAST BP <80 MM HG: CPT | Mod: CPTII,S$GLB,, | Performed by: UROLOGY

## 2020-07-27 PROCEDURE — 84153 ASSAY OF PSA TOTAL: CPT

## 2020-07-27 NOTE — PROGRESS NOTES
CC: ADT for prostate cancer    Gregg Mcbride is a 69 y.o. man who is here for the evaluation of Prostate cancer.    I did his TRUS bx of prostate on 6/12/18 for rising PSA since XRT combined with ADT back in 2010.  FINAL PATHOLOGIC DIAGNOSIS  PROSTATE BIOPSIES 1, 2, 3, 4, 5, AND 6:  NO DISTINCT RESIDUAL CARCINOMA IDENTIFIED  PICTURE CONSISTENT WITH PRIOR THERAPY    His pre-treatment PSA was 31.  TRUS bx of prostate showed Anza 8 and 9 (4+5) on 1/22/2010.  The patient received an Eligard injection on 2/4/10.  He completed radiotherapy to the prostate and pelvic nodes on 7/1/10. His last Eligard injection was given in August of 2012.    Following his combo therapy, his PSA remained undetectable until 6/2015.  Then starting 2/2016, his PSA is detectable with PSA greater than 0.2.  However, his PSA double time has been about 12 months.  His PSA has risen to 1.3 on 10/25/18 from 0.85 on 2/2/18.    In addition,he has a hx of kidney stone disease with hx of left ureteral stricture.  MAG 3 renal scan doneon 11/29/2011 showed 95% function on the right kidney and 5 % kidney function with abnormal respond to lasix.    Hx of RCC, s/p partial left nephrectomy on 5/10/11 for RCC clear cell type (pT1a No Mo).    He is on flomax for his LUTS and reports no problem with urination.    Denies flank pain, dysuira, hematuria.    Nocturia 2 to 3 x.    He is here with CT RSS and blood tests.    Last Lurpon injection 1/23/20.      Past Medical History:   Diagnosis Date    Arthritis     Cancer 5/2011    Kidney    Cataract     Chronic kidney disease     stones    Diabetes mellitus     History of colon polyps     Hypertension     Morbid obesity with BMI of 50.0-59.9, adult     Prostate cancer      Past Surgical History:   Procedure Laterality Date    COLONOSCOPY N/A 5/19/2016    Procedure: COLONOSCOPY;  Surgeon: Itz Simeon MD;  Location: [facility] ([address] [place] [address]);  Service: Endoscopy;  Laterality: N/A;  Last colonoscopy 2011 with  Dr. Simeon    HERNIA REPAIR      KIDNEY SURGERY  2011     Social History     Tobacco Use    Smoking status: Never Smoker    Smokeless tobacco: Never Used   Substance Use Topics    Alcohol use: Yes     Alcohol/week: 0.8 standard drinks     Types: 1 Standard drinks or equivalent per week     Comment: socially    Drug use: No     Family History   Problem Relation Age of Onset    Diabetes Mother     Hypertension Mother     Coronary artery disease Father     Hypertension Sister     Hypertension Brother     Amblyopia Neg Hx     Blindness Neg Hx     Cataracts Neg Hx     Glaucoma Neg Hx     Macular degeneration Neg Hx     Retinal detachment Neg Hx     Strabismus Neg Hx      Allergy:  Review of patient's allergies indicates:   Allergen Reactions    Sulfa (sulfonamide antibiotics) Rash     Outpatient Encounter Medications as of 7/27/2020   Medication Sig Dispense Refill    atorvastatin (LIPITOR) 20 MG tablet TAKE ONE TABLET BY MOUTH EVERY DAY. 90 tablet 1    calcium crb,cit-D3-nwt94-mesen (CITRACAL + BONE DENSITY) 300-200-13.5 mg-unit-mg Tab Take 1 tablet by mouth once daily. 120 tablet 3    carvediloL (COREG) 12.5 MG tablet TAKE ONE TABLET BY MOUTH TWICE DAILY 180 tablet 1    hydroCHLOROthiazide (HYDRODIURIL) 25 MG tablet TAKE ONE TABLET BY MOUTH EVERY DAY 90 tablet 0    losartan (COZAAR) 100 MG tablet TAKE ONE TABLET BY MOUTH EVERY DAY 90 tablet 1    potassium citrate (UROCIT-K 10) 10 mEq (1,080 mg) TbSR Take 1 tablet (10 mEq total) by mouth 2 (two) times daily with meals. 180 tablet 3    sildenafil (VIAGRA) 100 MG tablet Take 1 tablet (100 mg total) by mouth daily as needed for Erectile Dysfunction. 6 tablet 3    spironolactone (ALDACTONE) 25 MG tablet TAKE ONE TABLET BY MOUTH EVERY DAY 90 tablet 1    tamsulosin (FLOMAX) 0.4 mg Cap Take 1 capsule (0.4 mg total) by mouth once daily. 90 capsule 3    methocarbamol (ROBAXIN) 750 MG Tab Take 1 tablet (750 mg total) by mouth 3 (three) times daily.  (Patient not taking: Reported on 7/27/2020) 20 tablet 0     Facility-Administered Encounter Medications as of 7/27/2020   Medication Dose Route Frequency Provider Last Rate Last Dose    leuprolide (6 month) injection 45 mg  45 mg Intramuscular Q6 Months Lance Padron MD   45 mg at 01/23/20 1541    leuprolide (6 month) injection 45 mg  45 mg Intramuscular Q6 Months Lance Padron MD        leuprolide (6 month) SyKt 45 mg  45 mg Intramuscular Q6 Months Lance Padron MD   45 mg at 11/09/18 1018    leuprolide (6 month) SyKt 45 mg  45 mg Intramuscular Q6 Months Lance Padron MD        leuprolide (6 month) SyKt 45 mg  45 mg Intramuscular Q6 Months Lance Padron MD   45 mg at 05/13/19 1308    leuprolide (6 month) SyKt 45 mg  45 mg Intramuscular Q6 Months Lance Padron MD        lidocaine HCL 2% jelly   Topical (Top) Once Lance Padron MD         Review of Systems   General ROS: GENERAL:  No weight gain or loss  SKIN:  No rashes or lacerations  HEAD:  No headaches  EYES:  No exophthalmos, jaundice or blindness  EARS:  No dizziness, tinnitus or hearing loss  NOSE:  No changes in smell  MOUTH & THROAT:  No dyskinetic movements or obvious goiter  CHEST:  No shortness of breath, hyperventilation or cough  CARDIOVASCULAR:  No tachycardia or chest pain  ABDOMEN:  No nausea, vomiting, pain, constipation or diarrhea  URINARY:  No frequency, dysuria or sexual dysfunction  ENDOCRINE:  No polydipsia, polyuria  MUSCULOSKELETAL:  No pain or stiffness of the joints  NEUROLOGIC:  No weakness, sensory changes, seizures, confusion, memory loss, tremor or other abnormal movements  Physical Exam     Vitals:    07/27/20 1019   BP: 137/79   Pulse: (!) 56     Physical Exam  Genitalia:  Scrotum: no rash or lesion  Normal symmetric epididymis without masses  Normal vas palpated  Normal size, symmetric testicles with no masses   Normal urethral meatus with no discharge  Normal circumcised penis with no lesion   Rectal:  Normal perineum  and anus upon inspection.  Normal tone, no masses or tenderness;     LABS:  Lab Results   Component Value Date    PSA 0.05 10/02/2013    PSA 0.06 01/28/2013    PSA 0.07 08/15/2012    PSADIAG 1.4 01/13/2020    PSADIAG 1.2 04/26/2019    PSADIAG 1.3 10/25/2018     Lab Results   Component Value Date    CREATININE 1.4 01/13/2020    CREATININE 1.4 08/30/2019    CREATININE 1.2 04/26/2019     Results for orders placed or performed in visit on 01/13/20   Testosterone   Result Value Ref Range    Testosterone, Total 26 (L) 304 - 1227 ng/dL   Results for orders placed or performed in visit on 02/02/18   Testosterone   Result Value Ref Range    Testosterone, Total 282 195.0 - 1138.0 ng/dL     Urine Culture, Routine   Date Value Ref Range Status   12/02/2011   Final    MULTIPLE ORGANISMS ISOLATED. NONE IN PREDOMINANCE. REPEAT IF CLINICALLY NECESSARY.     Radiology:  Bone scan 4/2/18  Patient was administered 27.3 millicuries of technetium 99 M labeled MDP intravenously.  There is degenerative activity of the shoulders, wrists, hands, knees, ankles, and feet.  There are bilateral genu varus deformities from severe DJD.  There is poorly functioning left kidney.  Right kidney is normal.  There is no evidence of metastatic disease.  There is mild DJD of the C-spine.    MAG 3 renal scan 4/121/18  1.  Normal function and response to lasix of  the right kidney.  2. Persistent, severely decreased function of the left kidney with poor  response to lasix.  Not significantly changed compared to prior scan.  3. Differential function left kidney 12%, right kidney 88%. Not changed   Significantly when compared to last time.    CT RSS 4/26/19  1. Bilateral nonobstructing renal stones, 1 on the left, measuring 0.4 cm, and at least 4 on the right, the largest measuring 0.5 cm.  2.  Partial left nephrectomy with a residual atrophic left kidney.  Two stable left renal cysts.    Assessment and Plan:  Gregg was seen today for follow-up.    Diagnoses  and all orders for this visit:    Prostate cancer  -     Prostate Specific Antigen, Diagnostic; Future  -     leuprolide (6 month) injection 45 mg  -     Comprehensive metabolic panel; Future    Last lupron injection on 1/23/20 for his prostate cancer.  He will do a PSA today and get his lupron injection at the main campus.    Hx of chemical failure with rising PSA following definite XRT combined with ADT for 2 years for high grade prostate cancer.  No evidence of metastatic disease noted.  No definite recurrent prostate cancer noted on TRUS bx of prostate on 6/12/18.    Continue citracal daily.    For his kidney stone, stable stone burden on CT RSS.  I reviewed his CT today with him.  Continue Urocit K BID for his kidney stone disease but can't be aggressive in treatment due to his decreased renal function.  Atrophic left kidney.    I spent 25 minutes with the patient of which more than half was spent in direct consultation with the patient in regards to our treatment and plan.      Follow-up:  Follow up in about 6 months (around 1/27/2021), or lupron injection,, for PSA.

## 2020-07-27 NOTE — PATIENT INSTRUCTIONS
Lab Results   Component Value Date    PSA 0.05 10/02/2013    PSA 0.06 01/28/2013    PSA 0.07 08/15/2012    PSADIAG 1.4 01/13/2020    PSADIAG 1.2 04/26/2019    PSADIAG 1.3 10/25/2018

## 2020-08-10 ENCOUNTER — CLINICAL SUPPORT (OUTPATIENT)
Dept: UROLOGY | Facility: CLINIC | Age: 70
End: 2020-08-10
Payer: MEDICARE

## 2020-08-10 PROCEDURE — 96402 CHEMO HORMON ANTINEOPL SQ/IM: CPT | Mod: S$GLB,,, | Performed by: PHYSICIAN ASSISTANT

## 2020-08-10 PROCEDURE — 96402 PR CHEMOTHER HORMON ANTINEOPL SUB-Q/IM: ICD-10-PCS | Mod: S$GLB,,, | Performed by: PHYSICIAN ASSISTANT

## 2020-08-10 NOTE — PROGRESS NOTES
Pt identified using two identifiers (name and ). 45 mg of Lupron Depot injected IM into right ventrogluteal per Dr. Padron order.  PT tolerated injection well as seen walking to lobby.

## 2020-08-29 NOTE — LETTER
November 20, 2019      Cecilia Mroeno MD  [address]  [facility] [address]           [facility]  [address]  [facility] [address]  Phone: [phone]  Fax: [phone]          Patient: Gregg Mcbride   MR Number: [id number]   YOB: 1950   Date of Visit: 11/20/2019       Dear Dr. Cecilia Moreno:    Thank you for referring Gregg Mcbride to me for evaluation. Attached you will find relevant portions of my assessment and plan of care.    If you have questions, please do not hesitate to call me. I look forward to following Gregg Mcbride along with you.    Sincerely,    Nadia Garcia, OD    Enclosure  CC:  No Recipients    If you would like to receive this communication electronically, please contact [email or web address] or [phone] to request more information on [facility] access.    For providers and/or their staff who would like to refer a patient to [facility], please contact us through our one-stop-shop provider referral line, Stephanie Domingo, at [phone].    If you feel you have received this communication in error or would no longer like to receive these types of communications, please e-mail [email or web address]         
unknown

## 2020-09-16 ENCOUNTER — TELEPHONE (OUTPATIENT)
Dept: PRIMARY CARE CLINIC | Facility: CLINIC | Age: 70
End: 2020-09-16

## 2020-09-16 DIAGNOSIS — I11.9 HYPERTENSIVE HEART DISEASE WITHOUT HEART FAILURE: Primary | ICD-10-CM

## 2020-09-16 DIAGNOSIS — E78.5 HYPERLIPIDEMIA, UNSPECIFIED HYPERLIPIDEMIA TYPE: ICD-10-CM

## 2020-09-16 DIAGNOSIS — R73.03 PRE-DIABETES: ICD-10-CM

## 2020-12-16 ENCOUNTER — LAB VISIT (OUTPATIENT)
Dept: LAB | Facility: HOSPITAL | Age: 70
End: 2020-12-16
Attending: INTERNAL MEDICINE
Payer: MEDICARE

## 2020-12-16 DIAGNOSIS — R73.03 PRE-DIABETES: ICD-10-CM

## 2020-12-16 DIAGNOSIS — E78.5 HYPERLIPIDEMIA, UNSPECIFIED HYPERLIPIDEMIA TYPE: ICD-10-CM

## 2020-12-16 DIAGNOSIS — I11.9 HYPERTENSIVE HEART DISEASE WITHOUT HEART FAILURE: ICD-10-CM

## 2020-12-16 LAB
ALBUMIN SERPL BCP-MCNC: 3.7 G/DL (ref 3.5–5.2)
ALP SERPL-CCNC: 96 U/L (ref 55–135)
ALT SERPL W/O P-5'-P-CCNC: 20 U/L (ref 10–44)
ANION GAP SERPL CALC-SCNC: 9 MMOL/L (ref 8–16)
AST SERPL-CCNC: 16 U/L (ref 10–40)
BASOPHILS # BLD AUTO: 0.04 K/UL (ref 0–0.2)
BASOPHILS NFR BLD: 0.9 % (ref 0–1.9)
BILIRUB SERPL-MCNC: 0.6 MG/DL (ref 0.1–1)
BUN SERPL-MCNC: 16 MG/DL (ref 8–23)
CALCIUM SERPL-MCNC: 9.8 MG/DL (ref 8.7–10.5)
CHLORIDE SERPL-SCNC: 100 MMOL/L (ref 95–110)
CHOLEST SERPL-MCNC: 127 MG/DL (ref 120–199)
CHOLEST/HDLC SERPL: 3.8 {RATIO} (ref 2–5)
CO2 SERPL-SCNC: 30 MMOL/L (ref 23–29)
CREAT SERPL-MCNC: 1.2 MG/DL (ref 0.5–1.4)
DIFFERENTIAL METHOD: ABNORMAL
EOSINOPHIL # BLD AUTO: 0.1 K/UL (ref 0–0.5)
EOSINOPHIL NFR BLD: 2.4 % (ref 0–8)
ERYTHROCYTE [DISTWIDTH] IN BLOOD BY AUTOMATED COUNT: 12.2 % (ref 11.5–14.5)
EST. GFR  (AFRICAN AMERICAN): >60 ML/MIN/1.73 M^2
EST. GFR  (NON AFRICAN AMERICAN): >60 ML/MIN/1.73 M^2
ESTIMATED AVG GLUCOSE: 117 MG/DL (ref 68–131)
GLUCOSE SERPL-MCNC: 151 MG/DL (ref 70–110)
HBA1C MFR BLD HPLC: 5.7 % (ref 4–5.6)
HCT VFR BLD AUTO: 41.1 % (ref 40–54)
HDLC SERPL-MCNC: 33 MG/DL (ref 40–75)
HDLC SERPL: 26 % (ref 20–50)
HGB BLD-MCNC: 13.1 G/DL (ref 14–18)
IMM GRANULOCYTES # BLD AUTO: 0.01 K/UL (ref 0–0.04)
IMM GRANULOCYTES NFR BLD AUTO: 0.2 % (ref 0–0.5)
LDLC SERPL CALC-MCNC: 76 MG/DL (ref 63–159)
LYMPHOCYTES # BLD AUTO: 1.5 K/UL (ref 1–4.8)
LYMPHOCYTES NFR BLD: 33.4 % (ref 18–48)
MCH RBC QN AUTO: 30 PG (ref 27–31)
MCHC RBC AUTO-ENTMCNC: 31.9 G/DL (ref 32–36)
MCV RBC AUTO: 94 FL (ref 82–98)
MONOCYTES # BLD AUTO: 0.4 K/UL (ref 0.3–1)
MONOCYTES NFR BLD: 9.2 % (ref 4–15)
NEUTROPHILS # BLD AUTO: 2.5 K/UL (ref 1.8–7.7)
NEUTROPHILS NFR BLD: 53.9 % (ref 38–73)
NONHDLC SERPL-MCNC: 94 MG/DL
NRBC BLD-RTO: 0 /100 WBC
PLATELET # BLD AUTO: 300 K/UL (ref 150–350)
PMV BLD AUTO: 10.4 FL (ref 9.2–12.9)
POTASSIUM SERPL-SCNC: 4 MMOL/L (ref 3.5–5.1)
PROT SERPL-MCNC: 8.1 G/DL (ref 6–8.4)
RBC # BLD AUTO: 4.37 M/UL (ref 4.6–6.2)
SODIUM SERPL-SCNC: 139 MMOL/L (ref 136–145)
TRIGL SERPL-MCNC: 90 MG/DL (ref 30–150)
WBC # BLD AUTO: 4.58 K/UL (ref 3.9–12.7)

## 2020-12-16 PROCEDURE — 85025 COMPLETE CBC W/AUTO DIFF WBC: CPT

## 2020-12-16 PROCEDURE — 80061 LIPID PANEL: CPT

## 2020-12-16 PROCEDURE — 36415 COLL VENOUS BLD VENIPUNCTURE: CPT | Mod: PN

## 2020-12-16 PROCEDURE — 80053 COMPREHEN METABOLIC PANEL: CPT

## 2020-12-16 PROCEDURE — 83036 HEMOGLOBIN GLYCOSYLATED A1C: CPT

## 2020-12-22 ENCOUNTER — OFFICE VISIT (OUTPATIENT)
Dept: PRIMARY CARE CLINIC | Facility: CLINIC | Age: 70
End: 2020-12-22
Payer: MEDICARE

## 2020-12-22 VITALS
OXYGEN SATURATION: 96 % | DIASTOLIC BLOOD PRESSURE: 70 MMHG | HEART RATE: 67 BPM | SYSTOLIC BLOOD PRESSURE: 112 MMHG | HEIGHT: 69 IN | BODY MASS INDEX: 46.65 KG/M2 | WEIGHT: 315 LBS | TEMPERATURE: 98 F

## 2020-12-22 DIAGNOSIS — N52.9 ERECTILE DYSFUNCTION, UNSPECIFIED ERECTILE DYSFUNCTION TYPE: ICD-10-CM

## 2020-12-22 DIAGNOSIS — I11.9 HYPERTENSIVE HEART DISEASE WITHOUT HEART FAILURE: Primary | ICD-10-CM

## 2020-12-22 DIAGNOSIS — E66.01 MORBID OBESITY WITH BMI OF 45.0-49.9, ADULT: ICD-10-CM

## 2020-12-22 DIAGNOSIS — I70.0 ATHEROSCLEROSIS OF ABDOMINAL AORTA: ICD-10-CM

## 2020-12-22 DIAGNOSIS — I87.2 CHRONIC VENOUS INSUFFICIENCY: ICD-10-CM

## 2020-12-22 DIAGNOSIS — R73.03 PRE-DIABETES: ICD-10-CM

## 2020-12-22 DIAGNOSIS — E78.5 HYPERLIPIDEMIA, UNSPECIFIED HYPERLIPIDEMIA TYPE: ICD-10-CM

## 2020-12-22 DIAGNOSIS — Z12.11 COLON CANCER SCREENING: ICD-10-CM

## 2020-12-22 PROCEDURE — 99499 RISK ADDL DX/OHS AUDIT: ICD-10-PCS | Mod: S$GLB,,, | Performed by: INTERNAL MEDICINE

## 2020-12-22 PROCEDURE — 3008F PR BODY MASS INDEX (BMI) DOCUMENTED: ICD-10-PCS | Mod: CPTII,S$GLB,, | Performed by: INTERNAL MEDICINE

## 2020-12-22 PROCEDURE — 1159F MED LIST DOCD IN RCRD: CPT | Mod: S$GLB,,, | Performed by: INTERNAL MEDICINE

## 2020-12-22 PROCEDURE — 99215 OFFICE O/P EST HI 40 MIN: CPT | Mod: S$GLB,,, | Performed by: INTERNAL MEDICINE

## 2020-12-22 PROCEDURE — 3078F PR MOST RECENT DIASTOLIC BLOOD PRESSURE < 80 MM HG: ICD-10-PCS | Mod: CPTII,S$GLB,, | Performed by: INTERNAL MEDICINE

## 2020-12-22 PROCEDURE — 1126F PR PAIN SEVERITY QUANTIFIED, NO PAIN PRESENT: ICD-10-PCS | Mod: S$GLB,,, | Performed by: INTERNAL MEDICINE

## 2020-12-22 PROCEDURE — 3074F PR MOST RECENT SYSTOLIC BLOOD PRESSURE < 130 MM HG: ICD-10-PCS | Mod: CPTII,S$GLB,, | Performed by: INTERNAL MEDICINE

## 2020-12-22 PROCEDURE — 1101F PR PT FALLS ASSESS DOC 0-1 FALLS W/OUT INJ PAST YR: ICD-10-PCS | Mod: CPTII,S$GLB,, | Performed by: INTERNAL MEDICINE

## 2020-12-22 PROCEDURE — 3008F BODY MASS INDEX DOCD: CPT | Mod: CPTII,S$GLB,, | Performed by: INTERNAL MEDICINE

## 2020-12-22 PROCEDURE — 99999 PR PBB SHADOW E&M-EST. PATIENT-LVL IV: CPT | Mod: PBBFAC,,, | Performed by: INTERNAL MEDICINE

## 2020-12-22 PROCEDURE — 99999 PR PBB SHADOW E&M-EST. PATIENT-LVL IV: ICD-10-PCS | Mod: PBBFAC,,, | Performed by: INTERNAL MEDICINE

## 2020-12-22 PROCEDURE — 99215 PR OFFICE/OUTPT VISIT, EST, LEVL V, 40-54 MIN: ICD-10-PCS | Mod: S$GLB,,, | Performed by: INTERNAL MEDICINE

## 2020-12-22 PROCEDURE — 3288F FALL RISK ASSESSMENT DOCD: CPT | Mod: CPTII,S$GLB,, | Performed by: INTERNAL MEDICINE

## 2020-12-22 PROCEDURE — 3078F DIAST BP <80 MM HG: CPT | Mod: CPTII,S$GLB,, | Performed by: INTERNAL MEDICINE

## 2020-12-22 PROCEDURE — 1101F PT FALLS ASSESS-DOCD LE1/YR: CPT | Mod: CPTII,S$GLB,, | Performed by: INTERNAL MEDICINE

## 2020-12-22 PROCEDURE — 3288F PR FALLS RISK ASSESSMENT DOCUMENTED: ICD-10-PCS | Mod: CPTII,S$GLB,, | Performed by: INTERNAL MEDICINE

## 2020-12-22 PROCEDURE — 1159F PR MEDICATION LIST DOCUMENTED IN MEDICAL RECORD: ICD-10-PCS | Mod: S$GLB,,, | Performed by: INTERNAL MEDICINE

## 2020-12-22 PROCEDURE — 3074F SYST BP LT 130 MM HG: CPT | Mod: CPTII,S$GLB,, | Performed by: INTERNAL MEDICINE

## 2020-12-22 PROCEDURE — 99499 UNLISTED E&M SERVICE: CPT | Mod: S$GLB,,, | Performed by: INTERNAL MEDICINE

## 2020-12-22 PROCEDURE — 1126F AMNT PAIN NOTED NONE PRSNT: CPT | Mod: S$GLB,,, | Performed by: INTERNAL MEDICINE

## 2020-12-22 RX ORDER — CARVEDILOL 12.5 MG/1
12.5 TABLET ORAL 2 TIMES DAILY
Qty: 180 TABLET | Refills: 1 | Status: SHIPPED | OUTPATIENT
Start: 2020-12-22

## 2020-12-22 RX ORDER — LOSARTAN POTASSIUM 100 MG/1
100 TABLET ORAL DAILY
Qty: 90 TABLET | Refills: 1 | Status: SHIPPED | OUTPATIENT
Start: 2020-12-22

## 2020-12-22 RX ORDER — HYDROCHLOROTHIAZIDE 25 MG/1
25 TABLET ORAL DAILY
Qty: 90 TABLET | Refills: 1 | Status: SHIPPED | OUTPATIENT
Start: 2020-12-22

## 2020-12-22 RX ORDER — SILDENAFIL 100 MG/1
100 TABLET, FILM COATED ORAL DAILY PRN
Qty: 10 TABLET | Refills: 3 | Status: SHIPPED | OUTPATIENT
Start: 2020-12-22

## 2020-12-22 RX ORDER — A/SINGAPORE/GP1908/2015 IVR-180 (AN A/MICHIGAN/45/2015 (H1N1)PDM09-LIKE VIRUS, A/HONG KONG/4801/2014, NYMC X-263B (H3N2) (AN A/HONG KONG/4801/2014-LIKE VIRUS), AND B/BRISBANE/60/2008, WILD TYPE (A B/BRISBANE/60/2008-LIKE VIRUS) 15; 15; 15 UG/.5ML; UG/.5ML; UG/.5ML
INJECTION, SUSPENSION INTRAMUSCULAR
COMMUNITY
Start: 2020-12-18 | End: 2020-12-22

## 2020-12-22 RX ORDER — ATORVASTATIN CALCIUM 20 MG/1
20 TABLET, FILM COATED ORAL DAILY
Qty: 90 TABLET | Refills: 1 | Status: SHIPPED | OUTPATIENT
Start: 2020-12-22

## 2020-12-22 RX ORDER — SPIRONOLACTONE 25 MG/1
25 TABLET ORAL DAILY
Qty: 90 TABLET | Refills: 3 | Status: SHIPPED | OUTPATIENT
Start: 2020-12-22 | End: 2021-02-11

## 2020-12-22 NOTE — PROGRESS NOTES
Subjective:       Patient ID: Gregg Mcbride is a 70 y.o. male.    Chief Complaint: Annual Exam    Last seen 16 months ago. Returns for annual exam and f/u chronic medical conditions. No acute complaints, generally feels well. Compliant with meds except only takes Coreg once daily. No COVID concerns.     PMH:   Hypertension. Ex Stress Echo 3/11 negative for ischemia, LVH with EF 65%.  Hyperlipidemia.    Morbid Obesity, TSH normal 10/13.   Prostate Cancer diagnosed , followed by Rad/Onc and Urology, D. PSA 1.2 .  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  Left Renal Cell Carcinoma resected.   Abdominal Aorta Atherosclerosis, mild - on CT Abdomen .  H/O Lumbar spine injury, work related.   Normocytic Anemia, mild.   Elevated Glucose, HbA1c 5.4% .   Erectile Dysfunction.     Eye exam . Colonoscopy normal  (polyps in past). Flu shot . Pneumovax . Prevnar . Tdap . Shinigrix .    PSH: Lithotripsy. Ureteroscopic stone removal . Right IHR. Right foot surgery, pin in great toe due to crush injury. Partial Left Nephrectomy .    Social: Tobacco use - occasional cigarette. Alcohol - 1-2 beers, [place] on weekends. , wife is a liver transplant recipient. Adult daughter and son both live locally. Retired  at [employer]. Works part-time cutting grass.     FMH: Father  age 53 of [place], nonsmoker. Mother living age 90, health unknown (does take a lot of medications). Patient is the second of 12 children, some are diabetic.     Allergies: Sulfa - rash.    Medications: list reviewed and reconciled.       Review of Systems   Constitutional: Negative for activity change, appetite change, chills, fatigue, fever and unexpected weight change.   HENT: Negative for nasal congestion, ear pain, hearing loss, postnasal drip, rhinorrhea, sore throat, trouble swallowing and voice change.    Eyes: Negative for pain and visual disturbance.   Respiratory:  "Negative for apnea, cough, chest tightness, shortness of breath and wheezing.    Cardiovascular: Negative for chest pain, palpitations and leg swelling.   Gastrointestinal: Negative for abdominal pain, blood in stool, constipation, diarrhea, nausea and vomiting.   Genitourinary: Positive for erectile dysfunction. Negative for difficulty urinating, dysuria, frequency, hematuria, scrotal swelling and urgency.   Musculoskeletal: Positive for arthralgias. Negative for back pain, gait problem, myalgias, neck pain and neck stiffness.        Mild joint pain and stiffness in knees relieved with Tylenol taken on occasion.   Integumentary:  Negative for color change and rash.   Neurological: Negative for dizziness, seizures, syncope, weakness, numbness and headaches.   Hematological: Negative for adenopathy. Does not bruise/bleed easily.   Psychiatric/Behavioral: Negative for agitation, dysphoric mood and sleep disturbance. The patient is not nervous/anxious.          Objective:    /70, Pulse 67, Temp 98.3, O2 Sat 96%, Ht 5' 9", Wt 326.8 lbs, BMI=48  Physical Exam  Constitutional:       General: He is not in acute distress.     Appearance: He is well-developed. He is obese. He is not ill-appearing or diaphoretic.   HENT:      Head: Normocephalic and atraumatic.      Right Ear: Tympanic membrane, ear canal and external ear normal.      Left Ear: Tympanic membrane, ear canal and external ear normal.      Nose: Nose normal.   Eyes:      General: No scleral icterus.     Extraocular Movements: Extraocular movements intact.      Conjunctiva/sclera: Conjunctivae normal.      Pupils: Pupils are equal, round, and reactive to light.   Neck:      Musculoskeletal: Normal range of motion and neck supple.      Thyroid: No thyromegaly.      Vascular: No carotid bruit or JVD.   Cardiovascular:      Rate and Rhythm: Normal rate and regular rhythm.      Pulses: Normal pulses.      Heart sounds: Normal heart sounds. No murmur. No friction " rub. No gallop.    Pulmonary:      Effort: Pulmonary effort is normal. No respiratory distress.      Breath sounds: Normal breath sounds. No wheezing, rhonchi or rales.   Abdominal:      General: Bowel sounds are normal. There is no distension.      Palpations: Abdomen is soft. There is no mass.      Tenderness: There is no abdominal tenderness.      Hernia: No hernia is present.   Musculoskeletal: Normal range of motion.         General: No tenderness or deformity.      Right lower leg: No edema.      Left lower leg: No edema.      Comments: Mild crepitus in knees, no effusion or tenderness.    Lymphadenopathy:      Cervical: No cervical adenopathy.   Skin:     General: Skin is warm and dry.      Findings: No rash.   Neurological:      General: No focal deficit present.      Mental Status: He is alert and oriented to person, place, and time.      Cranial Nerves: No cranial nerve deficit.      Motor: No abnormal muscle tone.      Coordination: Coordination normal.      Gait: Gait normal.      Deep Tendon Reflexes: Reflexes are normal and symmetric.   Psychiatric:         Mood and Affect: Mood normal.         Behavior: Behavior normal.         Thought Content: Thought content normal.         Judgment: Judgment normal.         Lab Visit on 12/16/2020   Component Date Value    WBC 12/16/2020 4.58     RBC 12/16/2020 4.37*    Hemoglobin 12/16/2020 13.1*    Hematocrit 12/16/2020 41.1     MCV 12/16/2020 94     MCH 12/16/2020 30.0     MCHC 12/16/2020 31.9*    RDW 12/16/2020 12.2     Platelets 12/16/2020 300     MPV 12/16/2020 10.4     Immature Granulocytes 12/16/2020 0.2     Gran # (ANC) 12/16/2020 2.5     Immature Grans (Abs) 12/16/2020 0.01     Lymph # 12/16/2020 1.5     Mono # 12/16/2020 0.4     Eos # 12/16/2020 0.1     Baso # 12/16/2020 0.04     nRBC 12/16/2020 0     Gran % 12/16/2020 53.9     Lymph % 12/16/2020 33.4     Mono % 12/16/2020 9.2     Eosinophil % 12/16/2020 2.4     Basophil %  12/16/2020 0.9     Differential Method 12/16/2020 Automated     Sodium 12/16/2020 139     Potassium 12/16/2020 4.0     Chloride 12/16/2020 100     CO2 12/16/2020 30*    Glucose 12/16/2020 151*    BUN 12/16/2020 16     Creatinine 12/16/2020 1.2     Calcium 12/16/2020 9.8     Total Protein 12/16/2020 8.1     Albumin 12/16/2020 3.7     Total Bilirubin 12/16/2020 0.6     Alkaline Phosphatase 12/16/2020 96     AST 12/16/2020 16     ALT 12/16/2020 20     Anion Gap 12/16/2020 9     eGFR if African American 12/16/2020 >60.0     eGFR if non African Amer* 12/16/2020 >60.0     Cholesterol 12/16/2020 127     Triglycerides 12/16/2020 90     HDL 12/16/2020 33*    LDL Cholesterol 12/16/2020 76.0     HDL/Cholesterol Ratio 12/16/2020 26.0     Total Cholesterol/HDL Ra* 12/16/2020 3.8     Non-HDL Cholesterol 12/16/2020 94     Hemoglobin A1C 12/16/2020 5.7*    Estimated Avg Glucose 12/16/2020 117       Assessment:       1. Hypertensive heart disease without heart failure    2. Hyperlipidemia, unspecified hyperlipidemia type    3. Pre-diabetes    4. Atherosclerosis of abdominal aorta    5. Chronic venous insufficiency    6. Morbid obesity with BMI of 45.0-49.9, adult    7. Erectile dysfunction, unspecified erectile dysfunction type    8. Colon cancer screening        Plan:       Hypertensive heart disease without heart failure - controlled.  -     spironolactone (ALDACTONE) 25 MG tablet; Take 1 tablet (25 mg total) by mouth once daily.  Dispense: 90 tablet; Refill: 3  -     losartan (COZAAR) 100 MG tablet; Take 1 tablet (100 mg total) by mouth once daily.  Dispense: 90 tablet; Refill: 1  -     hydroCHLOROthiazide (HYDRODIURIL) 25 MG tablet; Take 1 tablet (25 mg total) by mouth once daily.  Dispense: 90 tablet; Refill: 1  -     carvediloL (COREG) 12.5 MG tablet; Take 1 tablet (12.5 mg total) by mouth 2 (two) times daily.  Dispense: 180 tablet; Refill: 1    Hyperlipidemia, unspecified hyperlipidemia type -  controlled.   -     atorvastatin (LIPITOR) 20 MG tablet; Take 1 tablet (20 mg total) by mouth once daily.  Dispense: 90 tablet; Refill: 1    Pre-diabetes - counseled on diet.     Atherosclerosis of abdominal aorta  -     atorvastatin (LIPITOR) 20 MG tablet; Take 1 tablet (20 mg total) by mouth once daily.  Dispense: 90 tablet; Refill: 1    Chronic venous insufficiency - controlled on current meds, no edema today.    Morbid obesity with BMI of 45.0-49.9, adult - counseled on diet for all of above.    Erectile dysfunction, unspecified erectile dysfunction type  -     sildenafiL (VIAGRA) 100 MG tablet; Take 1 tablet (100 mg total) by mouth daily as needed for Erectile Dysfunction.  Dispense: 10 tablet; Refill: 3    Colon cancer screening  -     Case Request Endoscopy: COLONOSCOPY - 5 year f/u due May 2021.

## 2021-03-04 DIAGNOSIS — C61 PROSTATE CANCER: Primary | ICD-10-CM

## 2021-03-08 ENCOUNTER — LAB VISIT (OUTPATIENT)
Dept: LAB | Facility: HOSPITAL | Age: 71
End: 2021-03-08
Attending: UROLOGY
Payer: MEDICARE

## 2021-03-08 DIAGNOSIS — C61 PROSTATE CANCER: ICD-10-CM

## 2021-03-08 LAB — COMPLEXED PSA SERPL-MCNC: 1.5 NG/ML (ref 0–4)

## 2021-03-08 PROCEDURE — 84153 ASSAY OF PSA TOTAL: CPT | Performed by: UROLOGY

## 2021-03-08 PROCEDURE — 36415 COLL VENOUS BLD VENIPUNCTURE: CPT | Mod: PO | Performed by: UROLOGY

## 2021-03-15 ENCOUNTER — TELEPHONE (OUTPATIENT)
Dept: UROLOGY | Facility: CLINIC | Age: 71
End: 2021-03-15

## 2021-03-18 ENCOUNTER — OFFICE VISIT (OUTPATIENT)
Dept: UROLOGY | Facility: CLINIC | Age: 71
End: 2021-03-18
Payer: MEDICARE

## 2021-03-18 VITALS
HEIGHT: 69 IN | WEIGHT: 315 LBS | SYSTOLIC BLOOD PRESSURE: 113 MMHG | DIASTOLIC BLOOD PRESSURE: 69 MMHG | HEART RATE: 83 BPM | BODY MASS INDEX: 46.65 KG/M2

## 2021-03-18 DIAGNOSIS — Z12.11 SPECIAL SCREENING FOR MALIGNANT NEOPLASMS, COLON: Primary | ICD-10-CM

## 2021-03-18 DIAGNOSIS — C61 PROSTATE CANCER: Primary | ICD-10-CM

## 2021-03-18 DIAGNOSIS — Z01.818 PRE-OP TESTING: ICD-10-CM

## 2021-03-18 PROCEDURE — 99999 PR PBB SHADOW E&M-EST. PATIENT-LVL IV: CPT | Mod: PBBFAC,,, | Performed by: UROLOGY

## 2021-03-18 PROCEDURE — 99499 UNLISTED E&M SERVICE: CPT | Mod: S$GLB,,, | Performed by: UROLOGY

## 2021-03-18 PROCEDURE — 1159F MED LIST DOCD IN RCRD: CPT | Mod: S$GLB,,, | Performed by: UROLOGY

## 2021-03-18 PROCEDURE — 3074F PR MOST RECENT SYSTOLIC BLOOD PRESSURE < 130 MM HG: ICD-10-PCS | Mod: CPTII,S$GLB,, | Performed by: UROLOGY

## 2021-03-18 PROCEDURE — 99499 RISK ADDL DX/OHS AUDIT: ICD-10-PCS | Mod: S$GLB,,, | Performed by: UROLOGY

## 2021-03-18 PROCEDURE — 99213 OFFICE O/P EST LOW 20 MIN: CPT | Mod: 25,S$GLB,, | Performed by: UROLOGY

## 2021-03-18 PROCEDURE — 1101F PT FALLS ASSESS-DOCD LE1/YR: CPT | Mod: CPTII,S$GLB,, | Performed by: UROLOGY

## 2021-03-18 PROCEDURE — 99999 PR PBB SHADOW E&M-EST. PATIENT-LVL IV: ICD-10-PCS | Mod: PBBFAC,,, | Performed by: UROLOGY

## 2021-03-18 PROCEDURE — 96402 PR CHEMOTHER HORMON ANTINEOPL SUB-Q/IM: ICD-10-PCS | Mod: S$GLB,,, | Performed by: UROLOGY

## 2021-03-18 PROCEDURE — 3008F PR BODY MASS INDEX (BMI) DOCUMENTED: ICD-10-PCS | Mod: CPTII,S$GLB,, | Performed by: UROLOGY

## 2021-03-18 PROCEDURE — 1159F PR MEDICATION LIST DOCUMENTED IN MEDICAL RECORD: ICD-10-PCS | Mod: S$GLB,,, | Performed by: UROLOGY

## 2021-03-18 PROCEDURE — 3078F PR MOST RECENT DIASTOLIC BLOOD PRESSURE < 80 MM HG: ICD-10-PCS | Mod: CPTII,S$GLB,, | Performed by: UROLOGY

## 2021-03-18 PROCEDURE — 1126F AMNT PAIN NOTED NONE PRSNT: CPT | Mod: S$GLB,,, | Performed by: UROLOGY

## 2021-03-18 PROCEDURE — 3288F FALL RISK ASSESSMENT DOCD: CPT | Mod: CPTII,S$GLB,, | Performed by: UROLOGY

## 2021-03-18 PROCEDURE — 3074F SYST BP LT 130 MM HG: CPT | Mod: CPTII,S$GLB,, | Performed by: UROLOGY

## 2021-03-18 PROCEDURE — 3078F DIAST BP <80 MM HG: CPT | Mod: CPTII,S$GLB,, | Performed by: UROLOGY

## 2021-03-18 PROCEDURE — 1126F PR PAIN SEVERITY QUANTIFIED, NO PAIN PRESENT: ICD-10-PCS | Mod: S$GLB,,, | Performed by: UROLOGY

## 2021-03-18 PROCEDURE — 3288F PR FALLS RISK ASSESSMENT DOCUMENTED: ICD-10-PCS | Mod: CPTII,S$GLB,, | Performed by: UROLOGY

## 2021-03-18 PROCEDURE — 3008F BODY MASS INDEX DOCD: CPT | Mod: CPTII,S$GLB,, | Performed by: UROLOGY

## 2021-03-18 PROCEDURE — 96402 CHEMO HORMON ANTINEOPL SQ/IM: CPT | Mod: S$GLB,,, | Performed by: UROLOGY

## 2021-03-18 PROCEDURE — 1101F PR PT FALLS ASSESS DOC 0-1 FALLS W/OUT INJ PAST YR: ICD-10-PCS | Mod: CPTII,S$GLB,, | Performed by: UROLOGY

## 2021-03-18 PROCEDURE — 99213 PR OFFICE/OUTPT VISIT, EST, LEVL III, 20-29 MIN: ICD-10-PCS | Mod: 25,S$GLB,, | Performed by: UROLOGY

## 2021-03-18 RX ORDER — SODIUM, POTASSIUM,MAG SULFATES 17.5-3.13G
1 SOLUTION, RECONSTITUTED, ORAL ORAL DAILY
Qty: 1 KIT | Refills: 0 | Status: SHIPPED | OUTPATIENT
Start: 2021-03-18 | End: 2021-03-20

## 2021-05-20 ENCOUNTER — TELEPHONE (OUTPATIENT)
Dept: ENDOSCOPY | Facility: HOSPITAL | Age: 71
End: 2021-05-20

## 2022-01-19 ENCOUNTER — PES CALL (OUTPATIENT)
Dept: ADMINISTRATIVE | Facility: CLINIC | Age: 72
End: 2022-01-19

## 2022-08-31 DIAGNOSIS — I10 HYPERTENSION: ICD-10-CM

## 2023-07-26 ENCOUNTER — PATIENT OUTREACH (OUTPATIENT)
Dept: ADMINISTRATIVE | Facility: HOSPITAL | Age: 73
End: 2023-07-26